# Patient Record
Sex: MALE | Race: OTHER | HISPANIC OR LATINO | URBAN - METROPOLITAN AREA
[De-identification: names, ages, dates, MRNs, and addresses within clinical notes are randomized per-mention and may not be internally consistent; named-entity substitution may affect disease eponyms.]

---

## 2018-03-26 ENCOUNTER — APPOINTMENT (EMERGENCY)
Dept: RADIOLOGY | Facility: HOSPITAL | Age: 43
DRG: 208 | End: 2018-03-26

## 2018-03-26 ENCOUNTER — HOSPITAL ENCOUNTER (INPATIENT)
Facility: HOSPITAL | Age: 43
LOS: 16 days | Discharge: HOME | DRG: 208 | End: 2018-04-11
Attending: EMERGENCY MEDICINE | Admitting: EMERGENCY MEDICINE

## 2018-03-26 DIAGNOSIS — E87.2 LACTIC ACIDOSIS: ICD-10-CM

## 2018-03-26 DIAGNOSIS — G93.89 ENCEPHALOMALACIA: ICD-10-CM

## 2018-03-26 DIAGNOSIS — G93.40 ACUTE ENCEPHALOPATHY: ICD-10-CM

## 2018-03-26 DIAGNOSIS — J96.01 ACUTE RESPIRATORY FAILURE WITH HYPOXIA (HCC): ICD-10-CM

## 2018-03-26 DIAGNOSIS — I96 NECROTIC ESCHAR (HCC): ICD-10-CM

## 2018-03-26 DIAGNOSIS — I10 ESSENTIAL HYPERTENSION: ICD-10-CM

## 2018-03-26 DIAGNOSIS — R56.9 SEIZURE (HCC): ICD-10-CM

## 2018-03-26 DIAGNOSIS — R41.89 UNRESPONSIVE: Primary | ICD-10-CM

## 2018-03-26 LAB
ALBUMIN SERPL BCP-MCNC: 4.3 G/DL (ref 3.5–5)
ALP SERPL-CCNC: 143 U/L (ref 46–116)
ALT SERPL W P-5'-P-CCNC: 39 U/L (ref 12–78)
AMPHETAMINES SERPL QL SCN: NEGATIVE
ANION GAP SERPL CALCULATED.3IONS-SCNC: 27 MMOL/L (ref 4–13)
ANION GAP SERPL CALCULATED.3IONS-SCNC: 8 MMOL/L (ref 4–13)
APAP SERPL-MCNC: <2 UG/ML (ref 10–30)
ARTERIAL PATENCY WRIST A: YES
AST SERPL W P-5'-P-CCNC: 101 U/L (ref 5–45)
BARBITURATES UR QL: NEGATIVE
BASE EXCESS BLDA CALC-SCNC: -2.5 MMOL/L
BASOPHILS # BLD AUTO: 0.01 THOUSANDS/ΜL (ref 0–0.1)
BASOPHILS # BLD AUTO: 0.05 THOUSANDS/ΜL (ref 0–0.1)
BASOPHILS NFR BLD AUTO: 0 % (ref 0–1)
BASOPHILS NFR BLD AUTO: 0 % (ref 0–1)
BENZODIAZ UR QL: NEGATIVE
BILIRUB SERPL-MCNC: 0.42 MG/DL (ref 0.2–1)
BUN SERPL-MCNC: 8 MG/DL (ref 5–25)
BUN SERPL-MCNC: 9 MG/DL (ref 5–25)
CALCIUM SERPL-MCNC: 7.7 MG/DL (ref 8.3–10.1)
CALCIUM SERPL-MCNC: 8.9 MG/DL (ref 8.3–10.1)
CHLORIDE SERPL-SCNC: 100 MMOL/L (ref 100–108)
CHLORIDE SERPL-SCNC: 106 MMOL/L (ref 100–108)
CK MB SERPL-MCNC: 4.2 NG/ML (ref 0–5)
CK MB SERPL-MCNC: <1 % (ref 0–2.5)
CK SERPL-CCNC: 459 U/L (ref 39–308)
CO2 SERPL-SCNC: 10 MMOL/L (ref 21–32)
CO2 SERPL-SCNC: 22 MMOL/L (ref 21–32)
COCAINE UR QL: NEGATIVE
CREAT SERPL-MCNC: 0.95 MG/DL (ref 0.6–1.3)
CREAT SERPL-MCNC: 1.52 MG/DL (ref 0.6–1.3)
EOSINOPHIL # BLD AUTO: 0.01 THOUSAND/ΜL (ref 0–0.61)
EOSINOPHIL # BLD AUTO: 0.23 THOUSAND/ΜL (ref 0–0.61)
EOSINOPHIL NFR BLD AUTO: 0 % (ref 0–6)
EOSINOPHIL NFR BLD AUTO: 2 % (ref 0–6)
ERYTHROCYTE [DISTWIDTH] IN BLOOD BY AUTOMATED COUNT: 16 % (ref 11.6–15.1)
ERYTHROCYTE [DISTWIDTH] IN BLOOD BY AUTOMATED COUNT: 16.2 % (ref 11.6–15.1)
ETHANOL SERPL-MCNC: 17 MG/DL (ref 0–3)
GFR SERPL CREATININE-BSD FRML MDRD: 26 ML/MIN/1.73SQ M
GFR SERPL CREATININE-BSD FRML MDRD: 46 ML/MIN/1.73SQ M
GLUCOSE SERPL-MCNC: 104 MG/DL (ref 65–140)
GLUCOSE SERPL-MCNC: 109 MG/DL (ref 65–140)
GLUCOSE SERPL-MCNC: 138 MG/DL (ref 65–140)
HCO3 BLDA-SCNC: 23 MMOL/L (ref 22–28)
HCT VFR BLD AUTO: 30.5 % (ref 36.5–49.3)
HCT VFR BLD AUTO: 34.6 % (ref 36.5–49.3)
HGB BLD-MCNC: 10.6 G/DL (ref 12–17)
HGB BLD-MCNC: 9.9 G/DL (ref 12–17)
HOROWITZ INDEX BLDA+IHG-RTO: 50 MM[HG]
LACTATE SERPL-SCNC: 1.9 MMOL/L (ref 0.5–2)
LACTATE SERPL-SCNC: 20.8 MMOL/L (ref 0.5–2)
LYMPHOCYTES # BLD AUTO: 0.33 THOUSANDS/ΜL (ref 0.6–4.47)
LYMPHOCYTES # BLD AUTO: 7.67 THOUSANDS/ΜL (ref 0.6–4.47)
LYMPHOCYTES NFR BLD AUTO: 5 % (ref 14–44)
LYMPHOCYTES NFR BLD AUTO: 63 % (ref 14–44)
MAGNESIUM SERPL-MCNC: 2 MG/DL (ref 1.6–2.6)
MCH RBC QN AUTO: 28.1 PG (ref 26.8–34.3)
MCH RBC QN AUTO: 28.2 PG (ref 26.8–34.3)
MCHC RBC AUTO-ENTMCNC: 30.6 G/DL (ref 31.4–37.4)
MCHC RBC AUTO-ENTMCNC: 32.5 G/DL (ref 31.4–37.4)
MCV RBC AUTO: 87 FL (ref 82–98)
MCV RBC AUTO: 92 FL (ref 82–98)
METHADONE UR QL: NEGATIVE
MONOCYTES # BLD AUTO: 0.39 THOUSAND/ΜL (ref 0.17–1.22)
MONOCYTES # BLD AUTO: 0.71 THOUSAND/ΜL (ref 0.17–1.22)
MONOCYTES NFR BLD AUTO: 6 % (ref 4–12)
MONOCYTES NFR BLD AUTO: 6 % (ref 4–12)
NEUTROPHILS # BLD AUTO: 3.65 THOUSANDS/ΜL (ref 1.85–7.62)
NEUTROPHILS # BLD AUTO: 5.48 THOUSANDS/ΜL (ref 1.85–7.62)
NEUTS SEG NFR BLD AUTO: 29 % (ref 43–75)
NEUTS SEG NFR BLD AUTO: 89 % (ref 43–75)
NRBC BLD AUTO-RTO: 0 /100 WBCS
NRBC BLD AUTO-RTO: 0 /100 WBCS
O2 CT BLDA-SCNC: 15 ML/DL (ref 16–23)
OPIATES UR QL SCN: NEGATIVE
OXYHGB MFR BLDA: 98.5 % (ref 94–97)
PCO2 BLDA: 42.9 MM HG (ref 36–44)
PCP UR QL: NEGATIVE
PEEP RESPIRATORY: 8 CM[H2O]
PH BLDA: 7.35 [PH] (ref 7.35–7.45)
PHOSPHATE SERPL-MCNC: 3.4 MG/DL (ref 2.3–4.1)
PLATELET # BLD AUTO: 48 THOUSANDS/UL (ref 149–390)
PLATELET # BLD AUTO: 48 THOUSANDS/UL (ref 149–390)
PLATELET # BLD AUTO: 71 THOUSANDS/UL (ref 149–390)
PMV BLD AUTO: 10 FL (ref 8.9–12.7)
PMV BLD AUTO: 10 FL (ref 8.9–12.7)
PMV BLD AUTO: 9.9 FL (ref 8.9–12.7)
PO2 BLDA: 148 MM HG (ref 75–129)
POTASSIUM SERPL-SCNC: 3.6 MMOL/L (ref 3.5–5.3)
POTASSIUM SERPL-SCNC: 4.1 MMOL/L (ref 3.5–5.3)
PROT SERPL-MCNC: 8.9 G/DL (ref 6.4–8.2)
RBC # BLD AUTO: 3.51 MILLION/UL (ref 3.88–5.62)
RBC # BLD AUTO: 3.77 MILLION/UL (ref 3.88–5.62)
SALICYLATES SERPL-MCNC: <3 MG/DL (ref 3–20)
SODIUM SERPL-SCNC: 136 MMOL/L (ref 136–145)
SODIUM SERPL-SCNC: 137 MMOL/L (ref 136–145)
SPECIMEN SOURCE: ABNORMAL
THC UR QL: NEGATIVE
VENT AC: 14
VENT- AC: AC
VT SETTING VENT: 470 ML
WBC # BLD AUTO: 12.44 THOUSAND/UL (ref 4.31–10.16)
WBC # BLD AUTO: 6.26 THOUSAND/UL (ref 4.31–10.16)

## 2018-03-26 PROCEDURE — 99291 CRITICAL CARE FIRST HOUR: CPT | Performed by: EMERGENCY MEDICINE

## 2018-03-26 PROCEDURE — 85025 COMPLETE CBC W/AUTO DIFF WBC: CPT | Performed by: EMERGENCY MEDICINE

## 2018-03-26 PROCEDURE — 94002 VENT MGMT INPAT INIT DAY: CPT

## 2018-03-26 PROCEDURE — 36415 COLL VENOUS BLD VENIPUNCTURE: CPT | Performed by: EMERGENCY MEDICINE

## 2018-03-26 PROCEDURE — 83605 ASSAY OF LACTIC ACID: CPT | Performed by: EMERGENCY MEDICINE

## 2018-03-26 PROCEDURE — 72125 CT NECK SPINE W/O DYE: CPT

## 2018-03-26 PROCEDURE — 83735 ASSAY OF MAGNESIUM: CPT | Performed by: EMERGENCY MEDICINE

## 2018-03-26 PROCEDURE — 71045 X-RAY EXAM CHEST 1 VIEW: CPT

## 2018-03-26 PROCEDURE — 80307 DRUG TEST PRSMV CHEM ANLYZR: CPT | Performed by: EMERGENCY MEDICINE

## 2018-03-26 PROCEDURE — 82805 BLOOD GASES W/O2 SATURATION: CPT | Performed by: STUDENT IN AN ORGANIZED HEALTH CARE EDUCATION/TRAINING PROGRAM

## 2018-03-26 PROCEDURE — 82550 ASSAY OF CK (CPK): CPT | Performed by: EMERGENCY MEDICINE

## 2018-03-26 PROCEDURE — 85049 AUTOMATED PLATELET COUNT: CPT | Performed by: EMERGENCY MEDICINE

## 2018-03-26 PROCEDURE — 94760 N-INVAS EAR/PLS OXIMETRY 1: CPT

## 2018-03-26 PROCEDURE — 82948 REAGENT STRIP/BLOOD GLUCOSE: CPT

## 2018-03-26 PROCEDURE — 99291 CRITICAL CARE FIRST HOUR: CPT

## 2018-03-26 PROCEDURE — 94770 HB EXHALED CARBON DIOXIDE TEST: CPT

## 2018-03-26 PROCEDURE — 36600 WITHDRAWAL OF ARTERIAL BLOOD: CPT

## 2018-03-26 PROCEDURE — C9113 INJ PANTOPRAZOLE SODIUM, VIA: HCPCS | Performed by: EMERGENCY MEDICINE

## 2018-03-26 PROCEDURE — 92610 EVALUATE SWALLOWING FUNCTION: CPT

## 2018-03-26 PROCEDURE — 80329 ANALGESICS NON-OPIOID 1 OR 2: CPT | Performed by: EMERGENCY MEDICINE

## 2018-03-26 PROCEDURE — 70450 CT HEAD/BRAIN W/O DYE: CPT

## 2018-03-26 PROCEDURE — 96361 HYDRATE IV INFUSION ADD-ON: CPT

## 2018-03-26 PROCEDURE — 5A1935Z RESPIRATORY VENTILATION, LESS THAN 24 CONSECUTIVE HOURS: ICD-10-PCS | Performed by: EMERGENCY MEDICINE

## 2018-03-26 PROCEDURE — 0BH17EZ INSERTION OF ENDOTRACHEAL AIRWAY INTO TRACHEA, VIA NATURAL OR ARTIFICIAL OPENING: ICD-10-PCS | Performed by: EMERGENCY MEDICINE

## 2018-03-26 PROCEDURE — 80320 DRUG SCREEN QUANTALCOHOLS: CPT | Performed by: EMERGENCY MEDICINE

## 2018-03-26 PROCEDURE — 80053 COMPREHEN METABOLIC PANEL: CPT | Performed by: EMERGENCY MEDICINE

## 2018-03-26 PROCEDURE — 80048 BASIC METABOLIC PNL TOTAL CA: CPT | Performed by: EMERGENCY MEDICINE

## 2018-03-26 PROCEDURE — 96374 THER/PROPH/DIAG INJ IV PUSH: CPT

## 2018-03-26 PROCEDURE — 82553 CREATINE MB FRACTION: CPT | Performed by: EMERGENCY MEDICINE

## 2018-03-26 PROCEDURE — 84100 ASSAY OF PHOSPHORUS: CPT | Performed by: EMERGENCY MEDICINE

## 2018-03-26 RX ORDER — LORAZEPAM 2 MG/ML
1 INJECTION INTRAMUSCULAR EVERY 6 HOURS PRN
Status: DISCONTINUED | OUTPATIENT
Start: 2018-03-26 | End: 2018-03-27

## 2018-03-26 RX ORDER — PANTOPRAZOLE SODIUM 40 MG/1
40 INJECTION, POWDER, FOR SOLUTION INTRAVENOUS
Status: DISCONTINUED | OUTPATIENT
Start: 2018-03-26 | End: 2018-03-26

## 2018-03-26 RX ORDER — PROPOFOL 10 MG/ML
5-50 INJECTION, EMULSION INTRAVENOUS
Status: DISCONTINUED | OUTPATIENT
Start: 2018-03-26 | End: 2018-03-26

## 2018-03-26 RX ORDER — LORAZEPAM 2 MG/ML
6 INJECTION INTRAMUSCULAR ONCE
Status: COMPLETED | OUTPATIENT
Start: 2018-03-26 | End: 2018-03-26

## 2018-03-26 RX ORDER — LORAZEPAM 2 MG/ML
INJECTION INTRAMUSCULAR
Status: COMPLETED
Start: 2018-03-26 | End: 2018-03-26

## 2018-03-26 RX ORDER — LABETALOL HYDROCHLORIDE 5 MG/ML
10 INJECTION, SOLUTION INTRAVENOUS EVERY 6 HOURS PRN
Status: DISCONTINUED | OUTPATIENT
Start: 2018-03-26 | End: 2018-04-07

## 2018-03-26 RX ORDER — LORAZEPAM 2 MG/ML
2 INJECTION INTRAMUSCULAR EVERY 6 HOURS PRN
Status: DISCONTINUED | OUTPATIENT
Start: 2018-03-26 | End: 2018-03-26

## 2018-03-26 RX ORDER — AMLODIPINE BESYLATE 5 MG/1
5 TABLET ORAL DAILY
Status: DISCONTINUED | OUTPATIENT
Start: 2018-03-27 | End: 2018-04-11 | Stop reason: HOSPADM

## 2018-03-26 RX ORDER — PANTOPRAZOLE SODIUM 40 MG/1
40 TABLET, DELAYED RELEASE ORAL
Status: DISCONTINUED | OUTPATIENT
Start: 2018-03-27 | End: 2018-03-26

## 2018-03-26 RX ORDER — ACETAMINOPHEN 325 MG/1
650 TABLET ORAL EVERY 6 HOURS PRN
Status: DISCONTINUED | OUTPATIENT
Start: 2018-03-26 | End: 2018-03-27

## 2018-03-26 RX ORDER — HEPARIN SODIUM 5000 [USP'U]/ML
5000 INJECTION, SOLUTION INTRAVENOUS; SUBCUTANEOUS EVERY 8 HOURS SCHEDULED
Status: DISCONTINUED | OUTPATIENT
Start: 2018-03-26 | End: 2018-04-11 | Stop reason: HOSPADM

## 2018-03-26 RX ORDER — SODIUM CHLORIDE 9 MG/ML
125 INJECTION, SOLUTION INTRAVENOUS CONTINUOUS
Status: DISCONTINUED | OUTPATIENT
Start: 2018-03-26 | End: 2018-03-26

## 2018-03-26 RX ORDER — ETOMIDATE 2 MG/ML
30 INJECTION INTRAVENOUS ONCE
Status: COMPLETED | OUTPATIENT
Start: 2018-03-26 | End: 2018-03-26

## 2018-03-26 RX ORDER — LABETALOL HYDROCHLORIDE 5 MG/ML
10 INJECTION, SOLUTION INTRAVENOUS ONCE
Status: COMPLETED | OUTPATIENT
Start: 2018-03-26 | End: 2018-03-26

## 2018-03-26 RX ORDER — PROPOFOL 10 MG/ML
100 INJECTION, EMULSION INTRAVENOUS ONCE
Status: COMPLETED | OUTPATIENT
Start: 2018-03-26 | End: 2018-03-26

## 2018-03-26 RX ORDER — PANTOPRAZOLE SODIUM 40 MG/1
40 TABLET, DELAYED RELEASE ORAL
Status: DISCONTINUED | OUTPATIENT
Start: 2018-03-27 | End: 2018-04-11 | Stop reason: HOSPADM

## 2018-03-26 RX ORDER — SUCCINYLCHOLINE CHLORIDE 20 MG/ML
120 INJECTION INTRAMUSCULAR; INTRAVENOUS ONCE
Status: COMPLETED | OUTPATIENT
Start: 2018-03-26 | End: 2018-03-26

## 2018-03-26 RX ORDER — LANOLIN ALCOHOL/MO/W.PET/CERES
400 CREAM (GRAM) TOPICAL DAILY
Status: DISCONTINUED | OUTPATIENT
Start: 2018-03-27 | End: 2018-04-11 | Stop reason: HOSPADM

## 2018-03-26 RX ORDER — THIAMINE MONONITRATE (VIT B1) 100 MG
100 TABLET ORAL DAILY
Status: DISCONTINUED | OUTPATIENT
Start: 2018-03-27 | End: 2018-04-11 | Stop reason: HOSPADM

## 2018-03-26 RX ORDER — LABETALOL HYDROCHLORIDE 5 MG/ML
INJECTION, SOLUTION INTRAVENOUS
Status: COMPLETED
Start: 2018-03-26 | End: 2018-03-26

## 2018-03-26 RX ORDER — ACETAMINOPHEN 160 MG/5ML
650 SUSPENSION, ORAL (FINAL DOSE FORM) ORAL EVERY 4 HOURS PRN
Status: DISCONTINUED | OUTPATIENT
Start: 2018-03-26 | End: 2018-03-26

## 2018-03-26 RX ORDER — POTASSIUM CHLORIDE 14.9 MG/ML
20 INJECTION INTRAVENOUS
Status: COMPLETED | OUTPATIENT
Start: 2018-03-26 | End: 2018-03-27

## 2018-03-26 RX ORDER — LEVETIRACETAM 500 MG/1
1000 TABLET ORAL EVERY 12 HOURS SCHEDULED
Status: DISCONTINUED | OUTPATIENT
Start: 2018-03-27 | End: 2018-03-29

## 2018-03-26 RX ORDER — CHLORHEXIDINE GLUCONATE 0.12 MG/ML
15 RINSE ORAL EVERY 12 HOURS SCHEDULED
Status: DISCONTINUED | OUTPATIENT
Start: 2018-03-26 | End: 2018-03-26

## 2018-03-26 RX ADMIN — SODIUM CHLORIDE 125 ML/HR: 0.9 INJECTION, SOLUTION INTRAVENOUS at 05:40

## 2018-03-26 RX ADMIN — PROPOFOL 50 MCG/KG/MIN: 10 INJECTION, EMULSION INTRAVENOUS at 03:53

## 2018-03-26 RX ADMIN — HEPARIN SODIUM 5000 UNITS: 5000 INJECTION, SOLUTION INTRAVENOUS; SUBCUTANEOUS at 05:40

## 2018-03-26 RX ADMIN — PANTOPRAZOLE SODIUM 40 MG: 40 INJECTION, POWDER, FOR SOLUTION INTRAVENOUS at 08:13

## 2018-03-26 RX ADMIN — SODIUM CHLORIDE 1000 ML: 0.9 INJECTION, SOLUTION INTRAVENOUS at 02:20

## 2018-03-26 RX ADMIN — PROPOFOL 100 MG: 10 INJECTION, EMULSION INTRAVENOUS at 05:31

## 2018-03-26 RX ADMIN — ACETAMINOPHEN 650 MG: 160 SUSPENSION ORAL at 13:11

## 2018-03-26 RX ADMIN — LABETALOL 20 MG/4 ML (5 MG/ML) INTRAVENOUS SYRINGE: at 05:32

## 2018-03-26 RX ADMIN — THIAMINE HYDROCHLORIDE 100 MG: 100 INJECTION, SOLUTION INTRAMUSCULAR; INTRAVENOUS at 09:55

## 2018-03-26 RX ADMIN — POTASSIUM CHLORIDE 20 MEQ: 200 INJECTION, SOLUTION INTRAVENOUS at 05:40

## 2018-03-26 RX ADMIN — FENTANYL CITRATE 50 MCG/HR: at 04:16

## 2018-03-26 RX ADMIN — SODIUM CHLORIDE 1000 ML: 0.9 INJECTION, SOLUTION INTRAVENOUS at 03:57

## 2018-03-26 RX ADMIN — CHLORHEXIDINE GLUCONATE 15 ML: 1.2 RINSE ORAL at 08:13

## 2018-03-26 RX ADMIN — LORAZEPAM: 2 INJECTION INTRAMUSCULAR; INTRAVENOUS at 05:31

## 2018-03-26 RX ADMIN — PROPOFOL 50 MCG/KG/MIN: 10 INJECTION, EMULSION INTRAVENOUS at 05:40

## 2018-03-26 RX ADMIN — SODIUM CHLORIDE 125 ML/HR: 0.9 INJECTION, SOLUTION INTRAVENOUS at 15:00

## 2018-03-26 RX ADMIN — HEPARIN SODIUM 5000 UNITS: 5000 INJECTION, SOLUTION INTRAVENOUS; SUBCUTANEOUS at 14:35

## 2018-03-26 RX ADMIN — SUCCINYLCHOLINE CHLORIDE 120 MG: 20 INJECTION, SOLUTION INTRAMUSCULAR; INTRAVENOUS at 02:20

## 2018-03-26 RX ADMIN — LABETALOL 20 MG/4 ML (5 MG/ML) INTRAVENOUS SYRINGE 10 MG: at 13:12

## 2018-03-26 RX ADMIN — PROPOFOL 100 MG: 10 INJECTION, EMULSION INTRAVENOUS at 03:00

## 2018-03-26 RX ADMIN — FOLIC ACID 1 MG: 5 INJECTION, SOLUTION INTRAMUSCULAR; INTRAVENOUS; SUBCUTANEOUS at 10:55

## 2018-03-26 RX ADMIN — PROPOFOL 50 MCG/KG/MIN: 10 INJECTION, EMULSION INTRAVENOUS at 08:13

## 2018-03-26 RX ADMIN — ETOMIDATE 30 MG: 2 INJECTION INTRAVENOUS at 02:20

## 2018-03-26 RX ADMIN — LABETALOL HYDROCHLORIDE 10 MG: 5 INJECTION, SOLUTION INTRAVENOUS at 05:17

## 2018-03-26 RX ADMIN — HEPARIN SODIUM 5000 UNITS: 5000 INJECTION, SOLUTION INTRAVENOUS; SUBCUTANEOUS at 21:51

## 2018-03-26 RX ADMIN — LEVETIRACETAM 1000 MG: 100 INJECTION, SOLUTION INTRAVENOUS at 16:35

## 2018-03-26 RX ADMIN — POTASSIUM CHLORIDE 20 MEQ: 200 INJECTION, SOLUTION INTRAVENOUS at 08:00

## 2018-03-26 RX ADMIN — LEVETIRACETAM 1500 MG: 100 INJECTION, SOLUTION INTRAVENOUS at 03:45

## 2018-03-26 RX ADMIN — LORAZEPAM 6 MG: 2 INJECTION INTRAMUSCULAR; INTRAVENOUS at 02:45

## 2018-03-26 NOTE — ED PROVIDER NOTES
History  Chief Complaint   Patient presents with    Loss of Consciousness     pt brought in  via EMS from [facility] with loss of consciousness and questionable seizure activity     Patient is brought in by ambulance from the [facility] now unresponsive  Patient is a Ashlee Chester  Per EMS report, patient potentially had a seizure and is intoxicated  On exam, patient is unresponsive, hypoxic with oxygen saturations in the 70s, heart rate 150-160, moves all 4 extremities though does not respond to a sternal rub  Does have dried blood along his mouth and his clothing is wet and saturated and urine  Assessment and plan:  Alcohol intoxication versus withdrawal seizure versus polypharmacy  Intubated for airway protection  CT of the head and cervical spine to rule out acute hemorrhage/fracture  Labs to rule out electrolyte abnormalities  IV fluid for resuscitation  None       No past medical history on file  No past surgical history on file  No family history on file  I have reviewed and agree with the history as documented  Social History   Substance Use Topics    Smoking status: Not on file    Smokeless tobacco: Not on file    Alcohol use Not on file        Review of Systems   Unable to perform ROS: Patient unresponsive       Physical Exam  ED Triage Vitals   Temperature Pulse Respirations Blood Pressure SpO2   03/26/18 0301 03/26/18 0301 03/26/18 0301 03/26/18 0301 03/26/18 0244   100 2 °F (37 9 °C) (!) 151 16 137/70 94 %      Temp Source Heart Rate Source Patient Position - Orthostatic VS BP Location FiO2 (%)   03/26/18 0301 03/26/18 0349 -- -- --   Probe Monitor         Pain Score       03/26/18 0301       No Pain           Orthostatic Vital Signs  Vitals:    03/26/18 0301 03/26/18 0349 03/26/18 0410   BP: 137/70 142/85 156/93   Pulse: (!) 151 (!) 133 (!) 133       Physical Exam   Constitutional: He appears well-developed and well-nourished  He appears distressed     HENT: Head: Normocephalic  Right Ear: External ear normal    Left Ear: External ear normal    Nose: Nose normal    Dried blood on lips with a small cut on the lower lip   Eyes: Conjunctivae are normal  Pupils are equal, round, and reactive to light  Neck:   In cervical collar   Cardiovascular: Regular rhythm, normal heart sounds and intact distal pulses  Exam reveals no gallop and no friction rub  No murmur heard  tachycardic   Pulmonary/Chest: Effort normal and breath sounds normal  No respiratory distress  He has no wheezes  He has no rales  He exhibits no tenderness  Abdominal: Soft  Bowel sounds are normal  He exhibits no distension  There is no tenderness  Musculoskeletal: Normal range of motion  He exhibits no edema or tenderness  Neurological: He is unresponsive  GCS eye subscore is 1  Moves all 4 extremities   Skin: Skin is warm  Capillary refill takes less than 2 seconds  He is diaphoretic  Chronic appearing wound to the LUE   Nursing note and vitals reviewed        ED Medications  Medications   LORazepam (ATIVAN) 2 mg/mL injection **AcuDose Override Pull** (not administered)   LORazepam (ATIVAN) 2 mg/mL injection **AcuDose Override Pull** (not administered)   sodium chloride 0 9 % bolus 1,000 mL (1,000 mL Intravenous New Bag 3/26/18 9862)   chlorhexidine (PERIDEX) 0 12 % oral rinse 15 mL (not administered)   heparin (porcine) subcutaneous injection 5,000 Units (not administered)   levETIRAcetam (KEPPRA) 1,000 mg in sodium chloride 0 9 % 100 mL IVPB (not administered)   propofol (DIPRIVAN) 1000 mg in 100 mL infusion (premix) (not administered)   fentaNYL 1250 mcg in sodium chloride 0 9% 125mL drip (50 mcg/hr Intravenous New Bag 3/26/18 3057)   sodium chloride 0 9 % infusion (not administered)   LORazepam (ATIVAN) 2 mg/mL injection 2 mg (not administered)   propofol (DIPRIVAN) 200 MG/20ML bolus injection 100 mg (not administered)   pantoprazole (PROTONIX) injection 40 mg (not administered) potassium chloride 20 mEq IVPB (premix) (not administered)   levETIRAcetam (KEPPRA) 1,500 mg in sodium chloride 0 9 % 100 mL IVPB (0 mg Intravenous Stopped 3/26/18 0357)   sodium chloride 0 9 % bolus 1,000 mL (0 mL Intravenous Stopped 3/26/18 0357)   etomidate (AMIDATE) 2 mg/mL injection 30 mg (30 mg Intravenous Given 3/26/18 0220)   succinylcholine (ANECTINE) 20 mg/mL injection 120 mg (120 mg Intravenous Given 3/26/18 0220)   LORazepam (ATIVAN) 2 mg/mL injection 6 mg (6 mg Intravenous Given 3/26/18 0245)   propofol (DIPRIVAN) 200 MG/20ML bolus injection 100 mg (100 mg Intravenous Given 3/26/18 0300)       Diagnostic Studies  Results Reviewed     Procedure Component Value Units Date/Time    CK Total with Reflex CKMB [[id number]]  (Abnormal) Collected:  03/26/18 0304    Lab Status:  Final result Specimen:  Blood from Arm, Right Updated:  03/26/18 0428     Total  (H) U/L     Salicylate level [[id number]]  (Abnormal) Collected:  03/26/18 0304    Lab Status:  Final result Specimen:  Blood from Arm, Right Updated:  54/61/70 5788     Salicylate Lvl <3 (L) mg/dL     Acetaminophen level [[id number]]  (Abnormal) Collected:  03/26/18 0304    Lab Status:  Final result Specimen:  Blood from Arm, Right Updated:  03/26/18 0428     Acetaminophen Level <2 (L) ug/mL     Comprehensive metabolic panel [[id number]]  (Abnormal) Collected:  03/26/18 0304    Lab Status:  Final result Specimen:  Blood from Arm, Right Updated:  03/26/18 0428     Sodium 137 mmol/L      Potassium 3 6 mmol/L      Chloride 100 mmol/L      CO2 10 (L) mmol/L      Anion Gap 27 (H) mmol/L      BUN 9 mg/dL      Creatinine 1 52 (H) mg/dL      Glucose 138 mg/dL      Calcium 8 9 mg/dL       (H) U/L      ALT 39 U/L      Alkaline Phosphatase 143 (H) U/L      Total Protein 8 9 (H) g/dL      Albumin 4 3 g/dL      Total Bilirubin 0 42 mg/dL      eGFR 26 ml/min/1 73sq m     Narrative:         National Kidney Disease Education Program recommendations are as follows:  GFR calculation is accurate only with a steady state creatinine  Chronic Kidney disease less than 60 ml/min/1 73 sq  meters  Kidney failure less than 15 ml/min/1 73 sq  meters  CKMB [[id number]] Collected:  03/26/18 0304    Lab Status:   In process Specimen:  Blood from Arm, Right Updated:  03/26/18 0428    CBC and differential [[id number]]  (Abnormal) Collected:  03/26/18 0304    Lab Status:  Final result Specimen:  Blood from Arm, Right Updated:  03/26/18 0421     WBC 12 44 (H) Thousand/uL      RBC 3 77 (L) Million/uL      Hemoglobin 10 6 (L) g/dL      Hematocrit 34 6 (L) %      MCV 92 fL      MCH 28 1 pg      MCHC 30 6 (L) g/dL      RDW 16 2 (H) %      MPV 9 9 fL      Platelets 71 (L) Thousands/uL      nRBC 0 /100 WBCs      Neutrophils Relative 29 (L) %      Lymphocytes Relative 63 (H) %      Monocytes Relative 6 %      Eosinophils Relative 2 %      Basophils Relative 0 %      Neutrophils Absolute 3 65 Thousands/µL      Lymphocytes Absolute 7 67 (H) Thousands/µL      Monocytes Absolute 0 71 Thousand/µL      Eosinophils Absolute 0 23 Thousand/µL      Basophils Absolute 0 05 Thousands/µL     Ethanol [[id number]]  (Abnormal) Collected:  03/26/18 0304    Lab Status:  Final result Specimen:  Blood from Arm, Right Updated:  03/26/18 0419     Ethanol Lvl 17 (H) mg/dL     Lactic acid in 2 hours [[id number]]     Lab Status:  No result Specimen:  Blood     Basic metabolic panel [[id number]]     Lab Status:  No result Specimen:  Blood     Magnesium [[id number]]     Lab Status:  No result Specimen:  Blood     Phosphorus [[id number]]     Lab Status:  No result Specimen:  Blood     CBC and differential [[id number]]     Lab Status:  No result Specimen:  Blood     Platelet count [[id number]]     Lab Status:  No result Specimen:  Blood     Lactic acid, plasma [[id number]]  (Abnormal) Collected:  03/26/18 0304    Lab Status:  Final result Specimen:  Blood from Arm, Right Updated:  03/26/18 0353     LACTIC ACID 20 8 (HH) mmol/L Narrative:         Result may be elevated if tourniquet was used during collection  Rapid drug screen, urine [[id number]]  (Normal) Collected:  03/26/18 0307    Lab Status:  Final result Specimen:  Urine from Urine, Catheter Updated:  03/26/18 0348     Amph/Meth UR Negative     Barbiturate Ur Negative     Benzodiazepine Urine Negative     Cocaine Urine Negative     Methadone Urine Negative     Opiate Urine Negative     PCP Ur Negative     THC Urine Negative    Narrative:         FOR MEDICAL PURPOSES ONLY  IF CONFIRMATION NEEDED PLEASE CONTACT THE LAB WITHIN 5 DAYS  Drug Screen Cutoff Levels:  AMPHETAMINE/METHAMPHETAMINES  1000 ng/mL  BARBITURATES     200 ng/mL  BENZODIAZEPINES     200 ng/mL  COCAINE      300 ng/mL  METHADONE      300 ng/mL  OPIATES      300 ng/mL  PHENCYCLIDINE     25 ng/mL  THC       50 ng/mL    POCT urinalysis dipstick [[id number]]     Lab Status:  No result Specimen:  Urine                  XR chest 1 view portable   ED Interpretation by Kaiser Buckley DO (03/26 5896)   No focal consolidations, ett proper position as interpreted by me independently       CT head without contrast   Final Result by Rita Candelaria MD (03/26 7187)      1  Status post right craniectomy and there is encephalomalacia within the right temporal lobe and bilateral frontal lobes which may be sequela of prior injury  2   No acute intracranial hemorrhage, midline shift, or mass effect  3   Chronic small vessel ischemic changes  Workstation performed: [id number]         CT cervical spine without contrast   Final Result by Rita Candelaria MD (03/26 6596)      No cervical spine fracture or traumatic malalignment                     Workstation performed: [id number]               Procedures  ECG 12 Lead Documentation  Date/Time: 3/26/2018 3:33 AM  Performed by: Massimo Solis  Authorized by: Erna Tiwari     Indications / Diagnosis:  Unresponsive  Patient location:  ED  Previous ECG: Previous ECG:  Unavailable  Rate:     ECG rate:  154    ECG rate assessment: tachycardic    Rhythm:     Rhythm: sinus tachycardia    Ectopy:     Ectopy: none    QRS:     QRS axis:  Normal  Conduction:     Conduction: normal    ST segments:     ST segments:  Non-specific    ST segment depression noted on lead: diffusely depressed  T waves:     T waves: non-specific            Phone Consults  ED Phone Contact    ED Course  ED Course as of Mar 26 0443   Mon Mar 26, 2018   7568 Likely 2/2 seizure activity  LACTIC ACID: (!!) 20 8   0429 MEDICAL ALCOHOL: (!) 17   0429 Total CK: (!) 459                               MDM  CritCare Time    Disposition  Final diagnoses:   Unresponsive   Seizure ([address] )   Lactic acidosis   Acute encephalopathy     Time reflects when diagnosis was documented in both MDM as applicable and the Disposition within this note     Time User Action Codes Description Comment    3/26/2018  3:55 AM Dufm Ardmore [R41 89] Unresponsive     3/26/2018  3:55 AM Loreda Cheshire Add [R56 9] Seizure ([address] )     3/26/2018  3:55 AM Loreda Cheshire Add [E87 2] Lactic acidosis     3/26/2018  4:00 AM Bryn Crease J Add [G93 40] Acute encephalopathy     3/26/2018  4:00 AM Gerardo Villasenor Modify [G93 40] Acute encephalopathy       ED Disposition     ED Disposition Condition Comment    Admit  Case was discussed with Dr Wenceslao Rao and the patient's admission status was agreed to be Admission Status: inpatient status to the service of Dr Wenceslao Rao   Follow-up Information    None       Patient's Medications    No medications on file     No discharge procedures on file  ED Provider  Attending physically available and evaluated Tau Tau One [place] And Ninety-Five  I managed the patient along with the ED Attending      Electronically Signed by         Viviana Rankin DO  03/26/18 9515

## 2018-03-26 NOTE — PROGRESS NOTES
Progress Note - Critical Care   Rachael Cano One [place] And [de-identified] 80 y o  male MRN: [id number]  [facility]: [id number]    Attending Physician: Teresa Mobley MD      ______________________________________________________________________  Principal Problem:    Acute respiratory failure with hypoxia [facility])  Active Problems:    Acute encephalopathy  Resolved Problems:    * No resolved hospital problems  *      Assessment and Plan:     Neuro:  Acute encephalopathy  -Seizure activity vs  Intoxication vs  Alcohol withdrawal   -History of right craniectomy   -Loaded with 1 5g keppra  Continue keppra 1g iv bid   -Consider starting thiamine and folate   -Consider EMU patient does not awaken    CV:   Tachycardic to 110s, improved from 150 on admission   -Tele strip unremarkable  Pulm:   Acute hypoxic respiratory failure  -Intubated on AC/VC 14/470/50/8, saturating 98%  -Continue respiratory protocol, bronchial hygiene  -ABG shows 7 35/43/148/23  Will titrate down FiO2    GI:   PPI Prophylaxis with IV protonix    Elevated LFTs  -On admission, , ALT 39, AlkP 143, Prot 8 9   -Likely secondary to etoh abuse  EtOH 17     :   SARA on admission  -Cr on admission 1 52 with unknown baseline  Most recently 0 95   -Continue NS @ 125/hr  -Rondon catheter in place, drained 1 9L/24 hours  F/E/N:   Fluids: NS @ 125/hr  Electrolytes: Lactate 21 -> 1 9  Bicarb 10 -> 22  Anion gap closed  BMP grossly wnl  Nutrition: NPO  Og tube in place    ID:   WBC 12 -> 6  Heme:   Anemia  -Hgb 10 6 -> 9 9  Drop likely dilutional with unknown baseline   -Likely secondary to alcohol abuse  DVT ppx  -Continue heparin and SCDs  -Monitor platelet count    Endo:   -No acute issues     Msk/Skin:   -Chronic appearing LUE wound   -Continue local wound care and repositioning      Disposition: Continue ICU care    Code Status: Level 1 - Full Code    ______________________________________________________________________    Chief Complaint:   intubated    HPI/24 Hour Events:   Tau One [employer] And Ninety-Five is a 80 y o  male Norma Castañeda brought in unresponsive by EMS from the [facility] with possible seizure activity  In the ED, he was febrile to 101 1, tachycardic to 150s, hypertensive to 150s  Labs notable for etOH level 17  bicarb 10, AG 27, cr 1 52, , lactate 21   utox negative  CT head showed right craniectomy with encephalomalacia, chronic ischemic changes, no acute ICH or mass effect  CT cervical spine shows no fracture or trauma  Received 6mg ativan, 2L saline, intubated and sedated with propofol and fentanyl, started on NS @ 125/hr  CXR post-intubation showed ETT in position and no consolidation  Transferred to the MICU     ______________________________________________________________________    Physical Exam:   General: middle age disheveled male lying in bed intubated and sedated  Malodorous feet  HEENT: perrl, dysconjugate gaze  Right craniectomy  Cardiovascular: tachycardic, regular rhythm w/o murmurs  Pulmonary: clear to auscultation bilaterally  Abdominal: soft, nontender, nondistended  Extremities:   Skin: chronic appearing LUE wound on anterior forearm  Neuro: intubated and sedated propofol 50 mcg/kg/min  GCS - [id number]  Moves all extremities       Tm: Tm 101 1 ( 100 8)  BP: 120/80 - 150/90  P: 110-150s  RR:14-31  O2: 98% on AC/VC 14/470/50/8    ______________________________________________________________________  Vitals:    18 0525 18 0540 18 0610 18 0709   BP: 140/98 147/96 135/95 128/86   BP Location: Left arm   Left arm   Pulse: (!) 114 (!) 114 (!) 114    Resp: 16 (!) 23 (!) 24    Temp: (!) 100 8 °F (38 2 °C) (!) 100 8 °F (38 2 °C) (!) 100 8 °F (38 2 °C)    TempSrc: Probe   Probe   SpO2: 98% 99% 98%    Weight: 74 3 kg (163 lb 12 8 oz)          Temperature:   Temp (24hrs), Av 7 °F (38 2 °C), Min:100 2 °F (37 9 °C), Max:101 1 °F (38 4 °C)    Current Temperature: (!) 100 8 °F (38 2 °C)  Weights:   IBW: -88 kg    There is no height or weight on file to calculate BMI  Weight (last 2 days)     Date/Time   Weight    03/26/18 0525  74 3 (163 8)    03/26/18 0301  86 2 (190)            Invasive lines and devices:   Invasive Devices     Peripheral Intravenous Line            Peripheral IV 03/26/18 Left Wrist less than 1 day    Peripheral IV 03/26/18 Right Arm less than 1 day    Peripheral IV 03/26/18 Right Wrist less than 1 day          Drain            NG/OG/Enteral Tube Orogastric less than 1 day    Urethral Catheter Temperature probe less than 1 day          Airway            ETT  Cuffed 8 mm less than 1 day              Hemodynamic Monitoring:  PAP:  , PAP mean:   , CVP:  , PCWP:   , SvO2:   , ScvO2:  , CO:  , CI:  , SVR:  , SVRI:  , PVR:  , SV:  , SVI:       Non-Invasive/Invasive Ventilation Settings:  Respiratory    Lab Data (Last 4 hours)    None         O2/Vent Data (Last 4 hours)      03/26 0435 03/26 0509 03/26 0706       Vent Mode CPAP/PS Spont AC/VC AC/VC     Resp Rate (BPM) (BPM)  14 14     Vt (mL) (mL)  470 470     FIO2 (%) (%)  100 50     PEEP (cmH2O) (cmH2O)  8 8     MV  8 4 7 35     FIO2 (%) (%) 50       PEEP (cmH2O) (cmH2O) 8       Pressure Support (cmH2O) (cmH20) 10       MV (Obs) 6 82       RSBI 42                 No results found for: PHART, DID5QTY, PO2ART, MDP5BEB, F2ATXLVM, BEART, SOURCE  SpO2: SpO2: 98 %, SpO2 Activity:  , SpO2 Device:  , Capnography: Capnography: Yes  Intake and Outputs:  I/O       03/24 0701 - 03/25 0700 03/25 0701 - 03/26 0700 03/26 0701 - 03/27 0700    I V  (mL/kg)  64 2 (0 9)     IV Piggyback  1000     Total Intake(mL/kg)  1064 2 (14 3)     Urine (mL/kg/hr)  1900     Total Output   1900      Net   -835 8                 Nutrition:        Diet Orders            Start     Ordered    03/26/18 0403  Diet NPO  Diet effective now     Question Answer Comment Diet Type NPO    RD to adjust diet per protocol? Yes        03/26/18 0402         Labs:     Results from last 7 days  Lab Units 03/26/18  0602 03/26/18  0304   WBC Thousand/uL 6 26 12 44*   HEMOGLOBIN g/dL 9 9* 10 6*   HEMATOCRIT % 30 5* 34 6*   PLATELETS Thousands/uL 48*  48* 71*   NEUTROS PCT % 89* 29*   MONOS PCT % 6 6       Results from last 7 days  Lab Units 03/26/18  0602 03/26/18  0304   SODIUM mmol/L 136 137   POTASSIUM mmol/L 4 1 3 6   CHLORIDE mmol/L 106 100   CO2 mmol/L 22 10*   BUN mg/dL 8 9   CREATININE mg/dL 0 95 1 52*   CALCIUM mg/dL 7 7* 8 9   TOTAL PROTEIN g/dL  --  8 9*   BILIRUBIN TOTAL mg/dL  --  0 42   ALK PHOS U/L  --  143*   ALT U/L  --  39   AST U/L  --  101*   GLUCOSE RANDOM mg/dL 109 138       Results from last 7 days  Lab Units 03/26/18  0602   MAGNESIUM mg/dL 2 0     Lab Results   Component Value Date    PHOS 3 4 03/26/2018          No results found for: TROPONINI    Results from last 7 days  Lab Units 03/26/18  0602 03/26/18  0304   LACTIC ACID mmol/L 1 9 20 8*     ABG:No results found for: PHART, OJT0QIU, PO2ART, XDT4IBB, R7BZZPAJ, BEART, SOURCE  EKG:   Micro:  No results found for: Hilaria Deep, SPUTUMCULTUR  Imaging: I have personally reviewed pertinent reports  Ct Head Without Contrast    Result Date: 3/26/2018  Impression: 1  Status post right craniectomy and there is encephalomalacia within the right temporal lobe and bilateral frontal lobes which may be sequela of prior injury  2   No acute intracranial hemorrhage, midline shift, or mass effect  3   Chronic small vessel ischemic changes  Workstation performed: [id number]     Ct Cervical Spine Without Contrast    Result Date: 3/26/2018  Impression: No cervical spine fracture or traumatic malalignment    Workstation performed: [id number]     Allergies: Not on File  Medications:   Scheduled Meds:    Current Facility-Administered Medications:  chlorhexidine 15 mL [employer] Q12H [address] Antelmo Sommer MD fentaNYL 50 mcg/hr Intravenous Continuous Lynette Rankin MD Last Rate: 50 mcg/hr (03/26/18 0416)   heparin (porcine) 5,000 Units Subcutaneous [facility] Lynette Rankin MD    levETIRAcetam 1,000 mg Intravenous Q12H [address] Lynette Rankin MD    LORazepam 2 mg Intravenous Q6H PRN Lynette Rankin MD    pantoprazole 40 mg Intravenous Q24H [address] Lynette Rankin MD    potassium chloride 20 mEq Intravenous Q2H Lynette Rankin MD Last Rate: 20 mEq (03/26/18 0540)   propofol 5-50 mcg/kg/min Intravenous Titrated Lynette Rankin MD Last Rate: 50 mcg/kg/min (03/26/18 0540)   sodium chloride 125 mL/hr Intravenous Continuous Lynette Rankin MD Last Rate: 125 mL/hr (03/26/18 0540)     Continuous Infusions:    fentaNYL 50 mcg/hr Last Rate: 50 mcg/hr (03/26/18 0416)   propofol 5-50 mcg/kg/min Last Rate: 50 mcg/kg/min (03/26/18 0540)   sodium chloride 125 mL/hr Last Rate: 125 mL/hr (03/26/18 0540)     PRN Meds:    LORazepam 2 mg Q6H PRN     VTE Pharmacologic Prophylaxis: Heparin  VTE Mechanical Prophylaxis: sequential compression device    Portions of the record may have been created with voice recognition software  Occasional wrong word or "sound a like" substitutions may have occurred due to the inherent limitations of voice recognition software  Read the chart carefully and recognize, using context, where substitutions have occurred      Murray Ocampo MD

## 2018-03-26 NOTE — RESPIRATORY THERAPY NOTE
RT Ventilator Management Note  Tau Tau One [place] And Ninety-Five 80 y o  male MRN: [id number]  Unit/Bed#: ED 05 Encounter: [id number]      Daily Screen     No data found  Physical Exam:   Assessment Type: Assess only  General Appearance: Sedated  Respiratory Pattern: Assisted  Chest Assessment: Chest expansion symmetrical  Bilateral Breath Sounds: (P) Coarse      Resp Comments: (P) Pt intubated at bedside by MD  22@ the lips  Bilateral breath sounds  Pt placed on vent  No changes made at this time  Will continue to monitor

## 2018-03-26 NOTE — RESPIRATORY THERAPY NOTE
RT Ventilator Management Note  Tau Tau One [place] And Ninety-Five 80 y o  male MRN: [id number]  Unit/Bed#: [facility] 01 Encounter: [id number]      Daily Screen       3/26/2018 0706             Spont breathing trial outcome[de-identified] -            Physical Exam:   Assessment Type: (P) Assess only  General Appearance: Sedated  Respiratory Pattern: Assisted  Chest Assessment: Chest expansion symmetrical  Bilateral Breath Sounds: Coarse  Suction: (P) ET Tube      Resp Comments: (P) pt appears comfortable on current settings, will continue to monitor

## 2018-03-26 NOTE — H&P
History and Physical - Critical Care  Rachael Rachael One [place] [de-identified] 80 y o  male MRN: [id number]  Unit/Bed#: ED 05 Encounter: [id number]     Reason for Admission / Chief Complaint: unresponsive     History of Present Illness:  Rachael Cano One [place] is a 80 y o  male Kayla Castañeda brought in unresponsive by EMS from the [facility] with possible seizure activity  Patient received ativan in the ED and was intubated for hypoxemia  Patient moving all 4 extremities in the ED with purposeful movements prior to sedation  CT head shows prior right craniectomy with encephalomalacia, but no acute findings  Patient is unable to provide any history  History obtained from chart review  Past Medical History:  No past medical history on file  Past Surgical History:  No past surgical history on file  Past Family History:  No family history on file       Social History:  History   Smoking Status    Not on file   Smokeless Tobacco    Not on file     History   Alcohol use Not on file     History   Drug use: Unknown     Marital Status: Unknown     Medications:  Current Facility-Administered Medications   Medication Dose Route Frequency    chlorhexidine (PERIDEX) 0 12 % oral rinse 15 mL  15 mL Swish & Spit Q12H [address]    fentaNYL 1250 mcg in sodium chloride 0 9% 125mL drip  50 mcg/hr Intravenous Continuous    heparin (porcine) subcutaneous injection 5,000 Units  5,000 Units Subcutaneous Q8H [address]    levETIRAcetam (KEPPRA) 1,000 mg in sodium chloride 0 9 % 100 mL IVPB  1,000 mg Intravenous Q12H [address]    LORazepam (ATIVAN) 2 mg/mL injection **AcuDose Override Pull**        LORazepam (ATIVAN) 2 mg/mL injection **AcuDose Override Pull**        LORazepam (ATIVAN) 2 mg/mL injection 2 mg  2 mg Intravenous Q6H PRN    pantoprazole (PROTONIX) injection 40 mg  40 mg Intravenous Q24H ABELINO    potassium chloride 20 mEq IVPB (premix)  20 mEq Intravenous Q2H    propofol (DIPRIVAN) 1000 mg in 100 mL infusion (premix)  5-50 mcg/kg/min Intravenous Titrated    propofol (DIPRIVAN) 200 MG/20ML bolus injection 100 mg  100 mg Intravenous Once    sodium chloride 0 9 % bolus 1,000 mL  1,000 mL Intravenous Once    sodium chloride 0 9 % infusion  125 mL/hr Intravenous Continuous     Home medications:  Prior to Admission medications    Not on File     Allergies:  Not on File     ROS:   Review of Systems   Unable to perform ROS: Intubated        Vitals:  Vitals:    18 0244 18 0301 18 0349 18 0410   BP:  137/70 142/85 156/93   Pulse:  (!) 151 (!) 133 (!) 133   Resp:  16 22 16   Temp:  100 2 °F (37 9 °C) (!) 100 6 °F (38 1 °C) (!) 100 8 °F (38 2 °C)   TempSrc:  Probe  Probe   SpO2: 94% 98% 99% 100%   Weight:  86 2 kg (190 lb)       Temperature:   Temp (24hrs), Av 5 °F (38 1 °C), Min:100 2 °F (37 9 °C), Max:100 8 °F (38 2 °C)    Current: Temperature: (!) 100 8 °F (38 2 °C)     Weights:   IBW: -88 kg  There is no height or weight on file to calculate BMI  Hemodynamic Monitoring:  N/A     Non-Invasive/Invasive Ventilation Settings:  Respiratory    Lab Data (Last 4 hours)    None         O2/Vent Data (Last 4 hours)       0244           Vent Mode AC/VC       Resp Rate (BPM) (BPM) 14       Vt (mL) (mL) 470       FIO2 (%) (%) 100       PEEP (cmH2O) (cmH2O) 8       MV 7 18                 No results found for: PHART, LVV4OOI, PO2ART, VSD5FCG, Y3UQAEIT, BEART, SOURCE  SpO2: SpO2: 100 %     Physical Exam:  Physical Exam   Constitutional: He appears well-developed and well-nourished  No distress  He is sedated and intubated  HENT:   Head: Normocephalic and atraumatic  Eyes: Conjunctivae are normal  Pupils are equal, round, and reactive to light  Neck: No JVD present  No tracheal deviation present  Cardiovascular: Regular rhythm, normal heart sounds and intact distal pulses  Tachycardia present  Pulmonary/Chest: Effort normal and breath sounds normal  No stridor  He is intubated  Abdominal: Soft  He exhibits no distension  Musculoskeletal: He exhibits no edema  Neurological: He is unresponsive  He exhibits normal muscle tone  GCS eye subscore is 1  GCS verbal subscore is 1  GCS motor subscore is 4  Skin: He is not diaphoretic  Nursing note and vitals reviewed  Labs:    Results from last 7 days  Lab Units 03/26/18  0304   WBC Thousand/uL 12 44*   HEMOGLOBIN g/dL 10 6*   HEMATOCRIT % 34 6*   PLATELETS Thousands/uL 71*   NEUTROS PCT % 29*   MONOS PCT % 6        Results from last 7 days  Lab Units 03/26/18  0304   SODIUM mmol/L 137   POTASSIUM mmol/L 3 6   CHLORIDE mmol/L 100   CO2 mmol/L 10*   BUN mg/dL 9   CREATININE mg/dL 1 52*   CALCIUM mg/dL 8 9   TOTAL PROTEIN g/dL 8 9*   BILIRUBIN TOTAL mg/dL 0 42   ALK PHOS U/L 143*   ALT U/L 39   AST U/L 101*   GLUCOSE RANDOM mg/dL 138                    Results from last 7 days  Lab Units 03/26/18  0304   LACTIC ACID mmol/L 20 8*     No results found for: TROPONINI     Imaging:     3/26 CTH: 1   Status post right craniectomy and there is encephalomalacia within the right temporal lobe and bilateral frontal lobes which may be sequela of prior injury  2   No acute intracranial hemorrhage, midline shift, or mass effect  3   Chronic small vessel ischemic changes    3/26 CT C-spine: No cervical spine fracture or traumatic malalignment  I have personally reviewed pertinent reports  and I have personally reviewed pertinent films in PACS  EKG: Sinus tachycardia with diffuse ST depressions likely demand ischemia This was personally reviewed by myself  Micro:  No results found for: Dariusz Ramirez SPUTUMCULTUR    Assessment: Middle aged male admitted to the ICU for acute encephalopathy and acute hypoxic respiratory failure  Plan:                  Neuro: Acute encephalopathy: questionable seizure activity vs intoxication  History of right craniectomy as seen on CT   Consider EMU if no improvement in neuro status  Keppra BID  Neuro checks Q1H  CV: Demand ischemia on EKG: IV hydration  Cardiac monitoring  Lung: Acute hypoxic respiratory failure: intubated on vent  Pulmonary toilet  Respiratory protocol  Wean FiO2 as tolerated  GI: No acute issues  Protonix for GI prophylaxis                 FEN: Fluids: normal saline at 125 mL/hr  Electrolytes: Potassium 3 6  Replete with 40 mEq KCl  Nutrition: NPO  Consider starting tube feeds if no improvement in neuro status                 : SARA: IV rehydration  Monitor urine output  Repeat BMP  ID: No acute issues  Elevated lactate likely secondary to seizure activity  Monitor for fever or worsening leukocytosis  Heme: No acute issues                 Endo: No acute issues  Msk/Skin: Chronic left upper extremity wound: monitor for signs of cellulitis  Wound care  Frequent repositioning to avoid skin breakdown                 Disposition: Admit to ICU     VTE Pharmacologic Prophylaxis: Heparin  VTE Mechanical Prophylaxis: sequential compression device     Invasive lines and devices: Invasive Devices     Peripheral Intravenous Line            Peripheral IV 03/26/18 Left Wrist less than 1 day    Peripheral IV 03/26/18 Right Arm less than 1 day    Peripheral IV 03/26/18 Right Wrist less than 1 day                 Code Status: Level 1 - Full Code  POA:    POLST:       Given critical illness, patient length of stay will require greater than two midnights  Portions of the record may have been created with voice recognition software  Occasional wrong word or "sound a like" substitutions may have occurred due to the inherent limitations of voice recognition software  Read the chart carefully and recognize, using context, where substitutions have occurred          Carlos Olivo MD

## 2018-03-26 NOTE — PROGRESS NOTES
Progress Note - ICU Transfer to Step down/med  surg  - Jony Ahr 43 y o  male MRN: [id number]    Unit/Bed#: MICU 01 Encounter: [id number]      Code Status: Level 1 - Full Code    Date of ICU admission: 3/26/18    Reason for ICU adm: acute encephalopathy    Active problems:   Patient Active Problem List   Diagnosis    Acute encephalopathy    Hypertension    Encephalomalacia    Head trauma    Status post craniectomy     Neuro:  Acute encephalopathy, improved  -Seizure activity vs  Intoxication vs  Alcohol withdrawal   -CIWA currently 3   -Loaded with 1 5g keppra  Continue keppra 1g iv bid   -Continue thiamine and folate    History of right craniectomy  -Performed 2 years ago, after an alcohol intoxication induced fall   -Encephalomalacia seen on CT scan performed 3/26      CV:   Hypertension  -Will start amlodipine 5mg po daily  Continue hydralazine prn      Pulm:   -No acute issues  S/p extubation      GI:   PPI Prophylaxis with protonix     Elevated LFTs  -On admission, , ALT 39, AlkP 143, Prot 8 9   -Likely secondary to etoh abuse  EtOH 17      :   SARA on admission, resolved  -Cr on admission 1 52 improved to 0 95   -Will d/c fluids and remove lucia catheter      F/E/N:   Fluids: none  Electrolytes: wnl  Nutrition: cleared for regular house diet w/ thin liquids     ID:   Fever of unknown origin  -Likely secondary to agitation vs  Alcohol withdrawal   -WBC 12 -> 6  Patient did become febrile to 102 but has since improved to 100  -Currently no identifiable source of infection  CXR shows no evidence of pneumonia  Consider sending for UA and/or blood cultures if he remains febrile       Heme:   Anemia  -Hgb 10 6 -> 9 9  Drop likely dilutional with unknown baseline   -Likely secondary to alcohol abuse       DVT ppx  -Continue heparin and SCDs  Thrombocytopenia  -Likely secondary to cirrhosis    -Monitor platelet count     Endo:   -No acute issues                Msk/Skin:   -Chronic appearing LUE wound   -Continue local wound care and repositioning      Disposition: Transfer to med/surg    Summary of clinical course:   Dawson Bowser is a 43year old male with PMH of HTN, etoh abuse, alcohol induced fall with subsequent head trauma and craniectomy 2 years ago, brought in as a Magdiel Castañeda unresponsive by EMS from the [facility] with possible seizure activity  In the ED, he was febrile to 101 1, tachycardic to 150s, hypertensive to 150s  Labs notable for etOH level 17  bicarb 10, AG 27, cr 1 52, , lactate 2 1, WBC 12  utox negative  CT head showed right craniectomy with encephalomalacia, chronic ischemic changes, no acute ICH or mass effect  CT cervical spine shows no fracture or trauma  Received 6mg ativan, 2L saline, intubated and sedated with propofol and fentanyl, started on NS @ 125/hr  CXR post-intubation showed ETT in position and no consolidation  Transferred to the MICU  Several hours later, his lab values significantly improved, with resolution of lactic acidosis, anion gap, SARA, and leukocytosis  He was subsequently extubated but remained somnolent throughout the morning  Eventually patient was awake enough to provide basic information, such as his name, , medical history  He states that he recently came over from [facility] and is currently homeless  The rest of his family lives in [facility]; he has several friends in the [facility] but does not know their contact information  He occasionally goes to [place] when he "gets a free bus pass"  Otherwise he denied any seizure history, medications  Patient passed an official swallow eval in the afternoon  He remained hypertensive and tachycardic throughout the day, but it subsequently improved after starting prn labetalol  He also developed a fever to 102 6 which subsequently resolved after administration of tylenol and cooling protocol  Patient was medically stable for transfer to med surg later in the early evening       Recent or scheduled procedures: Intubated and subsequently extubated on 3/26    Outstanding/pending diagnostics:   none    Hospital discharge planning:    Patient does not have a PCP  He is currently homeless and recently arrived here from [facility]  No family members in the [address] [facility]

## 2018-03-26 NOTE — ED ATTENDING ATTESTATION
Divya Frost MD, saw and evaluated the patient  I have discussed the patient with the resident/non-physician practitioner and agree with the resident's/non-physician practitioner's findings, Plan of Care, and MDM as documented in the resident's/non-physician practitioner's note, except where noted  All available labs and Radiology studies were reviewed  At this point I agree with the current assessment done in the Emergency Department  I have conducted an independent evaluation of this patient a history and physical is as follows:    Patient presents hypoxic, has history of alcohol withdrawal seizures, EMS states patient intoxicated and likely had seizure  No further history available  On arrival, patient hypoxic with sats in the 70s, tachycardic with heart rate of 160, patient appears to be purposeful, clawing at his genitals, has evidence of tongue biting  Fingerstick adequate, decision made to intubate patient for airway protection  After intubation, patient with roving eye movements  Pupils reactive to light  No signs of trauma to the head or neck  Oropharynx without evidence of tongue biting  Trachea midline  Heart tachycardic without murmurs, rubs, or gallops  Lungs coarse bilaterally  Abdomen soft with no masses and no distention  Extremities intact with good pulses  Neurologically, patient is sedated on the vent  Back exam unremarkable  Impression: Alcohol withdrawal seizures, hypoxia  Plan to CT head and neck, labs, will admit to the hospital   Patient required 40 minutes of bedside critical care time outside of separately billable procedures      Critical Care Time  CritCare Time    Procedures

## 2018-03-26 NOTE — PROGRESS NOTES
Patient was extubated later in the morning, became more responsive  He currently only nods or shakes his head, speaks one word sentences  He conveyed that he understands [place] and knows little [place]  He wrote down his name as Marisabel Beckwith

## 2018-03-26 NOTE — ED PROCEDURE NOTE
Procedure  Intubation  Date/Time: 3/26/2018 5:08 AM  Performed by: Leslie De Guzman  Authorized by: Eneida Carter     Patient location:  ED  Consent:     Consent obtained:  Emergent situation  Pre-procedure details:     Patient status:  Unresponsive    Mallampati score:  3    Pretreatment medications:  Etomidate    Paralytics:  Succinylcholine  Indications:     Indications for intubation: airway protection and hypoxemia    Procedure details:     Preoxygenation:  Nonrebreather mask    CPR in progress: no      Intubation method:  Oral    Oral intubation technique:  Glidescope    Laryngoscope blade: Mac 4    Tube size (mm):  8 0    Tube type:  Cuffed    Number of attempts:  1    Ventilation between attempts: no      Cricoid pressure: no      Tube visualized through cords: yes    Placement assessment:     ETT to lip:  22    ETT to teeth:  21    Tube secured with:  ETT uribe    Breath sounds:  Equal and absent over the epigastrium    Placement verification: chest rise, condensation, CXR verification, direct visualization, equal breath sounds, ETCO2 detector, tube exhalation and capnography      CXR findings:  ETT in proper place  Post-procedure details:     Patient tolerance of procedure:   Tolerated well, no immediate complications                     Bebeto Lynch DO  03/26/18 0510

## 2018-03-26 NOTE — RESPIRATORY THERAPY NOTE
RT Protocol Note  Tau Tau One [place] 80 y o  male MRN: [id number]  Unit/Bed#: AMAIRANI Encounter: [id number]    Assessment    Principal Problem:    Acute respiratory failure with hypoxia ([address] )  Active Problems:    Acute encephalopathy      Home Pulmonary Medications:  NA    No past medical history on file  Social History     Social History    Marital status: Unknown     Spouse name: N/A    Number of children: N/A    Years of education: N/A     Social History Main Topics    Smoking status: Not on file    Smokeless tobacco: Not on file    Alcohol use Not on file    Drug use: Unknown    Sexual activity: Not on file     Other Topics Concern    Not on file     Social History Narrative    No narrative on file       Subjective         Objective    Physical Exam:   Assessment Type: (P) Assess only  General Appearance: (P) Sedated  Respiratory Pattern: (P) Assisted  Chest Assessment: (P) Chest expansion symmetrical  Bilateral Breath Sounds: (P) Coarse    Vitals:  Blood pressure 156/93, pulse (!) 133, temperature (!) 100 8 °F (38 2 °C), temperature source Probe, resp  rate 16, weight 86 2 kg (190 lb), SpO2 (P) 100 %  Imaging and other studies: I have personally reviewed pertinent reports  Plan    Respiratory Plan: (P) Vent/NIV/HFNC        Resp Comments: (P) Pt not in any respiratory distress  Pt on ventilator  Uanble to obtain history from pt or chart  Breathe sounds are diminished and slightly coarse  No interventions started at this time

## 2018-03-26 NOTE — SPEECH THERAPY NOTE
Speech Language/Pathology  Speech/Language Pathology  Assessment    Patient Name: Rachael Cano One [place] And [place]  Today's Date: 3/26/2018     Problem List  Patient Active Problem List   Diagnosis    Acute encephalopathy     Alisia Cardenas brought in unresponsive by EMS from the [facility] with possible seizure activity  Patient received ativan in the ED and was intubated for hypoxemia  Patient moving all 4 extremities in the ED with purposeful movements prior to sedation  CT head shows prior right craniectomy with encephalomalacia, but no acute findings  Patient is unable to provide any history  Pt was intubated for less than 24 hours  Now alert, writes his name, indicates he knows very little [place]       Reason for consult:  R/o aspiration  Determine safest and least restrictive diet  Change in mental status    Current diet:  NPO  Premorbid diet[de-identified]  unknown  O2 requirement:  NC  Voice/Speech:  Low volume, mild hoarse, mumbled  Pt does not speak english  Social:  Unknown- states he is from [facility], does not state where he lives now, if he has family here  He repeats that he is in the hospital   No family present  Follows commands: For basic oral mech yes  Cognitive Status:  Alert, drowsy  Oral mech exam:  Full dentition  Full symmetry      Items administered:  Puree, soft solid, hard solid, 8 oz thin liquids by cup  Oral stage:  Lip closure: wfl  Mastication: slow but wfl  Bolus formation: wfl  Bolus control: wfl  Transfer: timely  Oral residue: none noted      Pharyngeal stage:  Swallow promptness: +  Laryngeal rise: wfl  No coughing or wet voicing  Secondary swallows: noted periodically    Esophageal stage:  No s/s reported    Summary:  Pt presents w/ functional swallow to begin a diet  Pt is more alert, better able to converse but still cannot recall where he lives        Recommendations:  Diet: regular   Liquid: thin  Meds: as tolerated  Supervision: assist as able  Positioning:Upright    Therapy Prognosis: fair  Prognosis considerations: ?able family support available  Frequency: x1 w/ meal  Eval only, No f/u tx indicated  Aspiration precautions posted    Results d/w:  Pt, nursing, family, physician, dietician      Goal(s):    Pt will tolerate least restrictive diet w/out s/s aspiration or oral/pharyngeal difficulties

## 2018-03-27 ENCOUNTER — APPOINTMENT (INPATIENT)
Dept: RADIOLOGY | Facility: HOSPITAL | Age: 43
DRG: 208 | End: 2018-03-27

## 2018-03-27 PROBLEM — I96 NECROTIC ESCHAR (HCC): Status: ACTIVE | Noted: 2018-03-27

## 2018-03-27 LAB
ANION GAP SERPL CALCULATED.3IONS-SCNC: 8 MMOL/L (ref 4–13)
BASOPHILS # BLD AUTO: 0.01 THOUSANDS/ΜL (ref 0–0.1)
BASOPHILS NFR BLD AUTO: 0 % (ref 0–1)
BUN SERPL-MCNC: 6 MG/DL (ref 5–25)
CALCIUM SERPL-MCNC: 8.7 MG/DL (ref 8.3–10.1)
CHLORIDE SERPL-SCNC: 102 MMOL/L (ref 100–108)
CO2 SERPL-SCNC: 27 MMOL/L (ref 21–32)
CREAT SERPL-MCNC: 0.59 MG/DL (ref 0.6–1.3)
EOSINOPHIL # BLD AUTO: 0.07 THOUSAND/ΜL (ref 0–0.61)
EOSINOPHIL NFR BLD AUTO: 1 % (ref 0–6)
ERYTHROCYTE [DISTWIDTH] IN BLOOD BY AUTOMATED COUNT: 15.8 % (ref 11.6–15.1)
GFR SERPL CREATININE-BSD FRML MDRD: 125 ML/MIN/1.73SQ M
GLUCOSE SERPL-MCNC: 89 MG/DL (ref 65–140)
HCT VFR BLD AUTO: 30.3 % (ref 36.5–49.3)
HGB BLD-MCNC: 9.6 G/DL (ref 12–17)
LYMPHOCYTES # BLD AUTO: 0.9 THOUSANDS/ΜL (ref 0.6–4.47)
LYMPHOCYTES NFR BLD AUTO: 11 % (ref 14–44)
MAGNESIUM SERPL-MCNC: 1.9 MG/DL (ref 1.6–2.6)
MCH RBC QN AUTO: 27.7 PG (ref 26.8–34.3)
MCHC RBC AUTO-ENTMCNC: 31.7 G/DL (ref 31.4–37.4)
MCV RBC AUTO: 88 FL (ref 82–98)
MONOCYTES # BLD AUTO: 0.35 THOUSAND/ΜL (ref 0.17–1.22)
MONOCYTES NFR BLD AUTO: 4 % (ref 4–12)
NEUTROPHILS # BLD AUTO: 6.71 THOUSANDS/ΜL (ref 1.85–7.62)
NEUTS SEG NFR BLD AUTO: 84 % (ref 43–75)
NRBC BLD AUTO-RTO: 0 /100 WBCS
PHOSPHATE SERPL-MCNC: 2.6 MG/DL (ref 2.7–4.5)
PLATELET # BLD AUTO: 50 THOUSANDS/UL (ref 149–390)
PMV BLD AUTO: 10.4 FL (ref 8.9–12.7)
POTASSIUM SERPL-SCNC: 3.4 MMOL/L (ref 3.5–5.3)
RBC # BLD AUTO: 3.46 MILLION/UL (ref 3.88–5.62)
SODIUM SERPL-SCNC: 137 MMOL/L (ref 136–145)
WBC # BLD AUTO: 8.06 THOUSAND/UL (ref 4.31–10.16)

## 2018-03-27 PROCEDURE — 99255 IP/OBS CONSLTJ NEW/EST HI 80: CPT | Performed by: PSYCHIATRY & NEUROLOGY

## 2018-03-27 PROCEDURE — 83735 ASSAY OF MAGNESIUM: CPT | Performed by: STUDENT IN AN ORGANIZED HEALTH CARE EDUCATION/TRAINING PROGRAM

## 2018-03-27 PROCEDURE — 76705 ECHO EXAM OF ABDOMEN: CPT

## 2018-03-27 PROCEDURE — 85025 COMPLETE CBC W/AUTO DIFF WBC: CPT | Performed by: STUDENT IN AN ORGANIZED HEALTH CARE EDUCATION/TRAINING PROGRAM

## 2018-03-27 PROCEDURE — 84100 ASSAY OF PHOSPHORUS: CPT | Performed by: STUDENT IN AN ORGANIZED HEALTH CARE EDUCATION/TRAINING PROGRAM

## 2018-03-27 PROCEDURE — 80048 BASIC METABOLIC PNL TOTAL CA: CPT | Performed by: STUDENT IN AN ORGANIZED HEALTH CARE EDUCATION/TRAINING PROGRAM

## 2018-03-27 RX ORDER — LORAZEPAM 2 MG/ML
1 INJECTION INTRAMUSCULAR EVERY 4 HOURS PRN
Status: DISCONTINUED | OUTPATIENT
Start: 2018-03-27 | End: 2018-03-28

## 2018-03-27 RX ORDER — ACETAMINOPHEN 325 MG/1
650 TABLET ORAL EVERY 12 HOURS PRN
Status: DISCONTINUED | OUTPATIENT
Start: 2018-03-27 | End: 2018-04-11 | Stop reason: HOSPADM

## 2018-03-27 RX ADMIN — LEVETIRACETAM 1000 MG: 500 TABLET ORAL at 21:32

## 2018-03-27 RX ADMIN — HEPARIN SODIUM 5000 UNITS: 5000 INJECTION, SOLUTION INTRAVENOUS; SUBCUTANEOUS at 05:51

## 2018-03-27 RX ADMIN — ACETAMINOPHEN 650 MG: 325 TABLET, FILM COATED ORAL at 00:15

## 2018-03-27 RX ADMIN — ACETAMINOPHEN 650 MG: 325 TABLET, FILM COATED ORAL at 21:30

## 2018-03-27 RX ADMIN — HEPARIN SODIUM 5000 UNITS: 5000 INJECTION, SOLUTION INTRAVENOUS; SUBCUTANEOUS at 21:30

## 2018-03-27 RX ADMIN — AMLODIPINE BESYLATE 5 MG: 5 TABLET ORAL at 08:04

## 2018-03-27 RX ADMIN — Medication 400 MCG: at 08:04

## 2018-03-27 RX ADMIN — POTASSIUM PHOSPHATE, MONOBASIC AND POTASSIUM PHOSPHATE, DIBASIC 12 MMOL: 224; 236 INJECTION, SOLUTION INTRAVENOUS at 10:29

## 2018-03-27 RX ADMIN — Medication 100 MG: at 08:04

## 2018-03-27 RX ADMIN — PANTOPRAZOLE SODIUM 40 MG: 40 TABLET, DELAYED RELEASE ORAL at 05:51

## 2018-03-27 RX ADMIN — LEVETIRACETAM 1000 MG: 500 TABLET ORAL at 08:04

## 2018-03-27 NOTE — PROGRESS NOTES
IM Residency Progress Note   Unit/Bed#: [facility] 920-01 Encounter: [id number]  SOD Team B       Yue Feldman 43 y o  male [phone]    Hospital Stay Days: 1      Assessment/Plan:    Principal Problem:    Acute encephalopathy  Active Problems:    Hypertension    Encephalomalacia    Head trauma    Status post craniectomy    Acute encephalopathy 2/2 Seizure Activity secondary to encephalomalacia vs Intoxication vs Alcohol Withdrawal vs Hepatic Encephalopathy - Ast, ALT ratio 2:1  Aox2  Improved  mild tremors  Does have a history of 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  Encephalomalacia seen on CT scan on 03/26  · Keppra 1 g po b i d  For now  · Continue thiamine and folate  · Ativan 1 mg q 6 hours p r n  for seizures  · Continue with regular diet  · Neurology consult for optimization of AEDs and outpatient follow-up  · F/u ammonia level, am labs  · Hold off on Pt/OT  As patient likely does not need     HTN  Currently Normotensive  · Cont amlodipine 5 mg p o  Daily  · Labetalol 10 mg q 6 hours p r n  for systolic blood pressure greater than 160     Elevated LFT's on admission  On admission , ALT 39, alk-phos 143  2:1 ratio  Likely in the setting of alcohol abuse and increased CK (due to immobility)  · Counseled on alcohol cessation  · F/u ammonia, pt-INR in am, other labs     SARA  Resolved  Cr initially 1 52 but improved to 0 95 with fluids  · Monitor BMPs     Anemia/Thrombocytopenia  Hemoglobin currently 9 9 and platelets currently 48  Unknown baseline  Could be in the setting of liver disease (alcoholic cirrhosis?)  No signs of active bleeding at the moment  · Will get RUQ  hold off  On GI consult for now  · Transfuse for hemoglobin less than 7 or symptomatic anemia  · Transfuse platelets less than 33,211     Fever  Currently afebrile, but was previously spiking temps up to 102  6   Likely secondary to agitation vs alcohol withdrawal, but no true clear etiology at this time  WBC count down from 12 to 6 patient  No identifiable source of infection  Chest x-ray shows no evidence of pneumonia  · Tylenol 650 mg q 6 hours p r n  · Consider UA and her blood culture if he remains febrile    Necrotic eschar on left forarm - unclear etiology  Pt notes it happened from trauma 3 weeks ago  - f/u with wound consult  - will likely need debridement  Will hold off on plastic surgery consult for now  Disposition:  Continue medical management    Subjective:   Patient is a very poor historian  Patient tells me [place] the park yesterday  Patient tells me that he has a history of 6 seizures in the past but has never taken medications  Patient told me that he does not drink at home however household by the nurse that upon her questioning earlier in the day he drank yesterday  He told me that his last drink was 2 years ago again he is unreliable historian  Patient is from [facility] and has no insurance  Denies any other past medical history  Has not seen a physician on a regular basis  Vitals: Temp (24hrs), Av 3 °F (37 9 °C), Min:98 1 °F (36 7 °C), Max:102 6 °F (39 2 °C)  Current: Temperature: 98 6 °F (37 °C)  Vitals:    18 2346 18 0300 18 0716 18 1033   BP: 133/76 127/79 141/69    BP Location: Right arm Right arm Right arm    Pulse: 99 101 96    Resp:     Temp: 100 °F (37 8 °C) 98 1 °F (36 7 °C) 98 6 °F (37 °C)    TempSrc: Oral Oral Oral    SpO2: 99% 99% 98%    Weight:    74 3 kg (163 lb 12 8 oz)   Height:    5' (1 524 m)    Body mass index is 31 99 kg/m²  I/O last 24 hours: In:  4 [P O :150; I V :1560 6; IV Piggyback:285 8]  Out: 3450 [Urine:3050; Emesis/NG output:400]      Physical Exam:  Physical Exam   Constitutional: No distress  HENT:   Head: Normocephalic and atraumatic  Eyes: EOM are normal  Pupils are equal, round, and reactive to light  Cardiovascular: Normal rate, regular rhythm and normal heart sounds  No murmur heard  Pulmonary/Chest: Effort normal and breath sounds normal  He has no wheezes  He has no rales  Abdominal: Soft  Bowel sounds are normal  He exhibits no distension  There is no tenderness  There is no rebound  Neurological: He is alert  Aox2  Cant rememebr the year  Has mild hand tremors  Skin: Skin is warm  He is not diaphoretic  Dark pigmented hands b/l  Dark necrotic eschar on left forearm  Non tender  Good radial pulses  Good cap refill in that arm  No similar lesions otehrwise  Has hyperkeratotic nails on feet b/l  Vitals reviewed      Invasive Devices     Peripheral Intravenous Line            Peripheral IV 03/26/18 Left Wrist 1 day                          Labs:   Recent Results (from the past 24 hour(s))   CBC and differential    Collection Time: 03/27/18  5:37 AM   Result Value Ref Range    WBC 8 06 4 31 - 10 16 Thousand/uL    RBC 3 46 (L) 3 88 - 5 62 Million/uL    Hemoglobin 9 6 (L) 12 0 - 17 0 g/dL    Hematocrit 30 3 (L) 36 5 - 49 3 %    MCV 88 82 - 98 fL    MCH 27 7 26 8 - 34 3 pg    MCHC 31 7 31 4 - 37 4 g/dL    RDW 15 8 (H) 11 6 - 15 1 %    MPV 10 4 8 9 - 12 7 fL    Platelets 50 (L) 869 - 390 Thousands/uL    nRBC 0 /100 WBCs    Neutrophils Relative 84 (H) 43 - 75 %    Lymphocytes Relative 11 (L) 14 - 44 %    Monocytes Relative 4 4 - 12 %    Eosinophils Relative 1 0 - 6 %    Basophils Relative 0 0 - 1 %    Neutrophils Absolute 6 71 1 85 - 7 62 Thousands/µL    Lymphocytes Absolute 0 90 0 60 - 4 47 Thousands/µL    Monocytes Absolute 0 35 0 17 - 1 22 Thousand/µL    Eosinophils Absolute 0 07 0 00 - 0 61 Thousand/µL    Basophils Absolute 0 01 0 00 - 0 10 Thousands/µL   Basic metabolic panel    Collection Time: 03/27/18  5:37 AM   Result Value Ref Range    Sodium 137 136 - 145 mmol/L    Potassium 3 4 (L) 3 5 - 5 3 mmol/L    Chloride 102 100 - 108 mmol/L    CO2 27 21 - 32 mmol/L    Anion Gap 8 4 - 13 mmol/L    BUN 6 5 - 25 mg/dL    Creatinine 0 59 (L) 0 60 - 1 30 mg/dL Glucose 89 65 - 140 mg/dL    Calcium 8 7 8 3 - 10 1 mg/dL    eGFR 125 ml/min/1 73sq m   Magnesium    Collection Time: 03/27/18  5:37 AM   Result Value Ref Range    Magnesium 1 9 1 6 - 2 6 mg/dL   Phosphorus    Collection Time: 03/27/18  5:37 AM   Result Value Ref Range    Phosphorus 2 6 (L) 2 7 - 4 5 mg/dL     Radiology Results: I have personally reviewed pertinent reports  Other Diagnostic Testing:   I have personally reviewed pertinent reports          Active Meds:   Current Facility-Administered Medications   Medication Dose Route Frequency    acetaminophen (TYLENOL) tablet 650 mg  650 mg Oral Q6H PRN    amLODIPine (NORVASC) tablet 5 mg  5 mg Oral Daily    folic acid (FOLVITE) tablet 400 mcg  400 mcg Oral Daily    heparin (porcine) subcutaneous injection 5,000 Units  5,000 Units Subcutaneous Q8H [address]    labetalol (NORMODYNE) injection 10 mg  10 mg Intravenous Q6H PRN    levETIRAcetam (KEPPRA) tablet 1,000 mg  1,000 mg Oral Q12H ABELINO    LORazepam (ATIVAN) 2 mg/mL injection 1 mg  1 mg Intravenous Q6H PRN    pantoprazole (PROTONIX) EC tablet 40 mg  40 mg Oral Early Morning    potassium phosphate 12 mmol in sodium chloride 0 9 % 250 mL infusion  12 mmol Intravenous Once    thiamine (VITAMIN B1) tablet 100 mg  100 mg Oral Daily     VTE Pharmacologic Prophylaxis: Heparin  VTE Mechanical Prophylaxis: sequential compression device    Mode Lou MD

## 2018-03-27 NOTE — PROGRESS NOTES
Update from progress note earlier today:    - CIWA ~3  - C/w monitoring fever curve, will get blood cultures x2, and ua if patient spikes fever again  - differential for nectoric non tender eschar: trauma/burn (patient gives history of trauma 3 weeks ago) vs Infection vs arterial (less likely, as has good pulses, hepc?, Antiphospholipid syndrome?)  Infection differential includes (tick bite), although there is only one bite and not multiple, spider bite, unlikely deep fungal, unlikely calciphylaxis, unlikely anthrax  - TX as per wound care  May benefit from collagenase  Likely can hold off on debridement for now    - monitor vs picture in chart from ED yesterday

## 2018-03-27 NOTE — PROGRESS NOTES
[facility] Unit Transfer Note  Unit/Bed # @DBLINK ([facility],38809)@ Encounter: [id number]  SOD Team A          Manus Forget 43 y o  male [address] Problems: Principal Problem:    Acute encephalopathy  Active Problems:    Hypertension    Encephalomalacia    Head trauma    Status post craniectomy    Acute encephalopathy 2/2 Seizure Activity vs Intoxication vs Alcohol Withdrawal vs Hepatic Encephalopathy  Improved  CIWA currently 3  Does have a history of 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  Encephalomalacia seen on CT scan on 03/26  · Keppra 1 g po b i d   · Continue thiamine and folate  · Ativan 1 mg q 6 hours p r n  for seizures  · Passed speech/swallow eval, regular diet  · Consider neuro consult, will defer to day team    HTN  Currently Normotensive  · Cont amlodipine 5 mg p o  Daily  · Labetalol 10 mg q 6 hours p r n  for systolic blood pressure greater than 160    Elevated LFT's on admission  On admission , ALT 39, alk-phos 143  Likely in the setting of alcohol abuse and increased CK (due to immobility)  · Counseled on alcohol cessation    SARA  Resolved  Cr initially 1 52 but improved to 0 95 with fluids  · Monitor BMPs    Anemia/Thrombocytopenia  Hemoglobin currently 9 9 and platelets currently 48  Unknown baseline  Could be in the setting of liver disease (alcoholic cirrhosis?)  No signs of active bleeding at the moment  · Consider right upper quadrant ultrasound to evaluate liver  Will likely need GI follow-up  · Transfuse for hemoglobin less than 7 or symptomatic anemia  · Transfuse platelets less than 38,331    Fever  Currently afebrile, but was previously spiking temps up to 102  6  Likely secondary to agitation vs alcohol withdrawal, but no true clear etiology at this time  WBC count down from 12 to 6 patient  No identifiable source of infection  Chest x-ray shows no evidence of pneumonia  · Tylenol 650 mg q 6 hours p r n  · Consider UA and her blood culture if he remains febrile      VTE Pharmacologic Prophylaxis: Heparin  VTE Mechanical Prophylaxis: sequential compression device    Disposition: Patient requires Med/Surg    Hospital Course:  Patient 26-year-old male with past medical history of hypertension, alcohol abuse, alcohol-induced fall with subsequent head trauma and craniectomy years ago who is brought in unresponsive by EMS from the since could see no with possible seizure activity  In the ED he was afebrile to 101 1 degrees F, tachycardic to 150s, hypertensive to 150s  Labs were notable for an ethanol level of 17, bicarb 10, anion gap 27, creatinine 0 52, , lactate 2 1, WBC 12, U tox negative  CT head showed right craniectomy with encephalomalacia, chronic ischemic changes, no acute ICH or mass effect  CT cervical spine showed no fracture or trauma  Given 6 mg Ativan, 2 L saline, intubated and sedated with propofol and fentanyl, started on normal saline at 125 cc/hour  Chest x-ray post intubation showed ETT in position with no consolidation  Transferred to the MICU  Several hours later, his labs were improved significantly, with resolution of lactic acidosis, anion gap, acute kidney injury, leukocytosis  He was subsequently extubated the patient remained somnolent throughout the morning  Eventually patient was awake enough to provide basic information such as his name, date of birth, medical history  He states that he recently came over from [facility] and is currently homeless  The rest of his family lives in [place] and he has several friends in the [address]  but does not know the contact information  He goes to [facility] when he "gets a free bus pass"  Otherwise he denies any seizure history or home medication  Patient passed an official swallow eval in the afternoon  He remained hypertensive and tachycardic throughout the day but subsequently improved after starting p r n  labetalol    He also developed a fever of 102 6 which subsequently resolved with administration of Tylenol and cooling protocol  Patient was medically stable for transfer to [facility]  Subjective: Patient has no acute complaints  No pain  Currently did not want to continue out encouter as he was tired and wanted to go back to sleep  Objective:   Vitals:    03/26/18 1905 03/26/18 2140 03/26/18 2346 03/27/18 0300   BP: 127/82 125/75 133/76 127/79   BP Location:  Right arm Right arm Right arm   Pulse: (!) 106 102 99 101   Resp: 17 18 18 18   Temp: 100 4 °F (38 °C) 100 2 °F (37 9 °C) 100 °F (37 8 °C) 98 1 °F (36 7 °C)   TempSrc:  Oral Oral Oral   SpO2: 98% 100% 99% 99%   Weight:         I/O last 24 hours: In: 2910 6 [I V :1624 8; IV Piggyback:1285 8]  Out: 2961 [Urine:4950; Emesis/NG output:400]    Physical Exam   Constitutional: He appears well-developed and well-nourished  HENT:   Head: Normocephalic and atraumatic  Cardiovascular: Normal rate, regular rhythm, normal heart sounds and intact distal pulses  Exam reveals no gallop and no friction rub  No murmur heard  Pulmonary/Chest: Effort normal and breath sounds normal  No respiratory distress  He has no wheezes  He has no rales  Abdominal: Soft  Bowel sounds are normal  He exhibits no distension  There is no tenderness  There is no guarding  Musculoskeletal: He exhibits no edema or deformity  Skin: Skin is warm and dry  Vitals reviewed            Kishan Dhillon MD

## 2018-03-27 NOTE — CONSULTS
Consultation - Neurology   Daniel Perea 43 y o  male MRN: [id number]  Unit/Bed#: [facility] 920-01 Encounter: [id number]      Assessment/Plan   Assessment:  49-year-old male, with history of significant right hemisphere encephalomalacia status post right craniectomy, question of alcohol related seizures, who initially presented after losing consciousness at the [address] [place] [address] Ave  Unclear if these events simply attributable to alcohol use; patient does have evidence of bihemisphere encephalomalacia from a prior traumatic injury, which poses potential to produce seizure focus/cortical irritation  Plan:  1  Currently being maintained on Keppra 1000 mg twice daily  Discussed with case management checking affordability of AED's   2   Will obtain routine EEG to establish baseline epileptiform abnormalities, if present  3  Continuing Thiamine/Folic Acid, DT/Etoh withdrawal monitoring  4  Continued metabolic/infectious derangement monitoring/correction per primary team:   -Febrile state resolved since this morning, remains with resolved leukocytosis  Lower suspicion for CNS  process, can likely defer need for LP at this time and monitor clinical status  Recommend check UA and blood  cultures if febrile state returns  - US of RUQ pending with anemia/thrombocytopenia, monitoring blood counts/transfusion if needed  - Following ammonia, CBC/CMP, INR  5  Continued normotensive/eyglycemic goals per primary team    - On amlodipine, labetalol prn  6  Wound care consulted to evaluate necrotic eschar  7  Supportive care  8  Speech therapy following  9  Discussed plan of care with attending neurologist and primary team   Will continue to monitor neurologic status throughout workup      History of Present Illness     Reason for Consult / Principal Problem:  Seizure-like activity    HPI: Daniel Perea is a 43 y o  male with history of craniectomy, questionable history of alcohol-induced seizures who presents after initially being found unresponsive at the [facility] with concern of potential seizure-like activity  Patient initially was brought to the ED early yesterday morning after being [facility] the [facility]   Initially presented as unresponsive, hypoxic with oxygen saturations in the 70s, tachycardic  Neurologically was moving all 4 extremities the minimally responsive to sternal rub, exhibited dry blood around his mouth and clothing was wet/saturated with urine  He did receive Ativan in the ED (6 mg), was intubated for airway protection  Medical alcohol level of 17   Was given Keppra load and placed on maintenance Keppra 1000 mg every 12 hours, placed on folic acid and thiamine for alcohol withdrawal prevention  CT head revealing right craniectomy with encephalomalacia and chronic ischemic changes, without evidence of acute intracranial abnormality  Admitted to the MICU for care  Throughout the course of yesterday exhibited fever, max temp 102 6°  Patient later in the afternoon yesterday was extubated, becoming more responsive  He became more alert and perform staff of his name, date of birth and medical history  Review of notes reveal patient recently moved from [facility] to the [place] and is currently homeless  Patient was questioned today personally with the help of English speaking PCA  Patient states he is here because he had a fall in [facility], and has been at this hospital for the past 3 years  Chart review suggest patient recently moved here from [facility], but upon questioning patient he states he has been here the past 5 or 6 years  When also asked where patient lives he responds [place]  He has exhibited fluctuating stories to SOD and the nursing staff    He does not recall [place] at the casino prior to this presentation; and did voice to one staff member that he was at a park prior to coming to the hospital   He denied any history of seizure-like events in the past to this examiner; but he did voice to other staff members seizure-like activity has taken place on 5-6 prior instances  He states he was placed on an anti- epileptic medication in the past (unclear the reliability of patient's history giving)  He states he was never following with a neurologist in the past     Currently he denies any headache, neck pain or stiffness, dizziness/lightheaded sensation, abnormalities with vision or speech, focal weakness of an extremity or sensory changes, chest pain, shortness of breath  Patient states he drinks 2 beers every 3 days  Inpatient consult to Neurology  Consult performed by: Petar Enamorado ordered by: [employer]          Review of Systems   Constitutional: Negative  HENT: Negative for trouble swallowing and voice change  Eyes: Negative for photophobia and visual disturbance  Respiratory: Negative for shortness of breath  Cardiovascular: Negative for chest pain and palpitations  Gastrointestinal: Negative for abdominal pain, nausea and vomiting  Musculoskeletal: Negative for neck pain and neck stiffness  Skin: Negative  Neurological: Positive for seizures  Negative for dizziness, tremors, syncope, facial asymmetry, speech difficulty, weakness, light-headedness, numbness and headaches  Historical Information   Past Medical History:   Diagnosis Date    Encephalomalacia     Head trauma     Homelessness     Hypertension     Status post craniectomy      Past Surgical History:   Procedure Laterality Date    CRANIECTOMY       Social History   History   Alcohol Use    1 2 oz/week    2 Cans of beer per week     Comment: 2 cans of beer per week     History   Drug use: Unknown     History   Smoking Status    Not on file   Smokeless Tobacco    Not on file     Family History: No family history on file  Review of previous medical records was completed      Meds/Allergies   current meds:   Current Facility-Administered Medications   Medication Dose Route Frequency    acetaminophen (TYLENOL) tablet 650 mg  650 mg Oral Q6H PRN    amLODIPine (NORVASC) tablet 5 mg  5 mg Oral Daily    folic acid (FOLVITE) tablet 400 mcg  400 mcg Oral Daily    heparin (porcine) subcutaneous injection 5,000 Units  5,000 Units Subcutaneous Q8H [facility]    labetalol (NORMODYNE) injection 10 mg  10 mg Intravenous Q6H PRN    levETIRAcetam (KEPPRA) tablet 1,000 mg  1,000 mg Oral Q12H ABELINO    LORazepam (ATIVAN) 2 mg/mL injection 1 mg  1 mg Intravenous Q6H PRN    pantoprazole (PROTONIX) EC tablet 40 mg  40 mg Oral Early Morning    potassium phosphate 12 mmol in sodium chloride 0 9 % 250 mL infusion  12 mmol Intravenous Once    thiamine (VITAMIN B1) tablet 100 mg  100 mg Oral Daily    and PTA meds:   None       No Known Allergies    Objective   Vitals:Blood pressure 141/69, pulse 96, temperature 98 6 °F (37 °C), temperature source Oral, resp  rate 18, height 5' (1 524 m), weight 74 3 kg (163 lb 12 8 oz), SpO2 98 %  ,Body mass index is 31 99 kg/m²  Intake/Output Summary (Last 24 hours) at 03/27/18 1119  Last data filed at 03/27/18 0777   Gross per 24 hour   Intake             1210 ml   Output             1350 ml   Net             -140 ml       Invasive Devices: Invasive Devices     Peripheral Intravenous Line            Peripheral IV 03/26/18 Left Wrist 1 day                Physical Exam   Constitutional: He is oriented to person, place, and time  He appears well-developed and well-nourished  Overall poor hygiene   HENT:   Head: Normocephalic and atraumatic  Evidence of right tongue bite noted  Eyes: Conjunctivae and EOM are normal  Pupils are equal, round, and reactive to light  Scleral icterus is present  Neck: Normal range of motion  Neck supple  Cardiovascular: Regular rhythm  Tachycardia present  Pulmonary/Chest: Effort normal and breath sounds normal    Abdominal: Soft  Bowel sounds are normal    Musculoskeletal: Normal range of motion     Neurological: He is alert and oriented to person, place, and time  He has a normal Finger-Nose-Finger Test    Reflex Scores:       Patellar reflexes are 1+ on the right side and 1+ on the left side  Achilles reflexes are 1+ on the right side and 1+ on the left side  Skin: Skin is warm and dry  Necrotic scarring of the left forearm  Neurologic Exam     Mental Status   Oriented to person, place, and time  Patient is alert but fairly inattentive throughout personal examination  He is oriented to his name, and his birthday, states it is February and ended that its a Tuesday currently  Not oriented to the year  He states we are in a hospital, but cannot state which  He is able to identify simple colors and objects  He is able to follow simple commands, however more complex command following is inhibited by patient inattentiveness  He require some redirection and/repeated cuing with orientation questions and command following  Cranial Nerves     CN II   Visual fields full to confrontation  CN III, IV, VI   Pupils are equal, round, and reactive to light  Extraocular motions are normal      CN V   Facial sensation intact  CN VII   Facial expression full, symmetric  CN VIII   CN VIII normal      CN IX, X   CN IX normal    CN X normal      CN XI   CN XI normal      CN XII   CN XII normal      Motor Exam   Right arm pronator drift: absent  Left arm pronator drift: absent  He displays normal tone throughout  He displays intact and symmetric strength in the upper and lower extremities throughout, no evidence of motor focality/lateralization is appreciated  Sensory Exam   Light touch normal    Vibration normal      Inattentiveness inhibits fully accurate temperature sensation testing  With light touch and vibratory testing he displays no gross sensory deficits in the upper/lower extremities       Gait, Coordination, and Reflexes     Coordination   Finger to nose coordination: normal    Tremor   Resting tremor: absent  Intention tremor: present (Some low amplitude bilateral upper extremity tremoring with outstretched arms )    Reflexes   Right patellar: 1+  Left patellar: 1+  Right achilles: 1+  Left achilles: 1+  Right plantar: equivocal  Left plantar: equivocal  Right ankle clonus: absent  Left ankle clonus: absent  Negative Kernig/Brudzinski's; full range of motion in the neck without evidence of neck stiffness  No clear signs of meningismus on examination         Lab Results:   CBC:   Results from last 7 days  Lab Units 03/27/18  0537 03/26/18  0602 03/26/18  0304   WBC Thousand/uL 8 06 6 26 12 44*   RBC Million/uL 3 46* 3 51* 3 77*   HEMOGLOBIN g/dL 9 6* 9 9* 10 6*   HEMATOCRIT % 30 3* 30 5* 34 6*   MCV fL 88 87 92   PLATELETS Thousands/uL 50* 48*  48* 71*   , BMP/CMP:   Results from last 7 days  Lab Units 03/27/18  0537 03/26/18  0602 03/26/18  0304   SODIUM mmol/L 137 136 137   POTASSIUM mmol/L 3 4* 4 1 3 6   CHLORIDE mmol/L 102 106 100   CO2 mmol/L 27 22 10*   ANION GAP mmol/L 8 8 27*   BUN mg/dL 6 8 9   CREATININE mg/dL 0 59* 0 95 1 52*   GLUCOSE RANDOM mg/dL 89 109 138   CALCIUM mg/dL 8 7 7 7* 8 9   AST U/L  --   --  101*   ALT U/L  --   --  39   ALK PHOS U/L  --   --  143*   TOTAL PROTEIN g/dL  --   --  8 9*   BILIRUBIN TOTAL mg/dL  --   --  0 42   EGFR ml/min/1 73sq m 125 46 26   , Vitamin B12:   , HgBA1C:   , TSH:   , Coagulation:   , Lipid Profile:   , Ammonia:   , Urinalysis:       Invalid input(s): URIBILINOGEN, Drug Screen:   Results from last 7 days  Lab Units 03/26/18  0307   BARBITURATE UR  Negative   BENZODIAZEPINE UR  Negative   THC UR  Negative   COCAINE UR  Negative   METHADONE URINE  Negative   OPIATE UR  Negative   PCP UR  Negative   , Medication Drug Levels:       Invalid input(s): CARBAMAZEPINE,  PHENOBARB, LACOSAMIDE, OXCARBAZEPINE  Imaging Studies: I have personally reviewed pertinent films in PACS   CT head 03/26/2018: 1   Status post right craniectomy and there is encephalomalacia within the right temporal lobe and bilateral frontal lobes which may be sequela of prior injury  2   No acute intracranial hemorrhage, midline shift, or mass effect  3   Chronic small vessel ischemic changes  CT cervical spine 03/26/2018: No cervical spine fracture or traumatic malalignment      Chest x-ray 03/26/2018: Endotracheal and nasogastric tubes as above without pneumothorax  No acute cardiopulmonary disease  EKG, Pathology, and Other Studies: I have personally reviewed pertinent reports  VTE Prophylaxis: Sequential compression device (Venodyne)  and Heparin    Code Status: Level 1 - Full Code    Counseling / Coordination of Care  Total time spent today 60 minutes  Greater than 50% of total time was spent with the patient and / or family counseling and / or coordination of care  A description of the counseling / coordination of care: Discussed plan of care with primary team and CM
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
## 2018-03-28 ENCOUNTER — APPOINTMENT (INPATIENT)
Dept: NEUROLOGY | Facility: AMBULATORY SURGERY CENTER | Age: 43
DRG: 208 | End: 2018-03-28

## 2018-03-28 PROBLEM — E83.39 HYPOPHOSPHATEMIA: Status: ACTIVE | Noted: 2018-03-28

## 2018-03-28 PROBLEM — E87.6 HYPOKALEMIA: Status: ACTIVE | Noted: 2018-03-28

## 2018-03-28 LAB
ALBUMIN SERPL BCP-MCNC: 3.7 G/DL (ref 3.5–5)
ALP SERPL-CCNC: 105 U/L (ref 46–116)
ALT SERPL W P-5'-P-CCNC: 31 U/L (ref 12–78)
AMMONIA PLAS-SCNC: 28 UMOL/L (ref 11–35)
ANION GAP SERPL CALCULATED.3IONS-SCNC: 11 MMOL/L (ref 4–13)
AST SERPL W P-5'-P-CCNC: 61 U/L (ref 5–45)
BASOPHILS # BLD AUTO: 0.03 THOUSANDS/ΜL (ref 0–0.1)
BASOPHILS NFR BLD AUTO: 0 % (ref 0–1)
BILIRUB SERPL-MCNC: 1.2 MG/DL (ref 0.2–1)
BUN SERPL-MCNC: 10 MG/DL (ref 5–25)
CALCIUM SERPL-MCNC: 9 MG/DL (ref 8.3–10.1)
CHLORIDE SERPL-SCNC: 100 MMOL/L (ref 100–108)
CO2 SERPL-SCNC: 25 MMOL/L (ref 21–32)
CREAT SERPL-MCNC: 0.67 MG/DL (ref 0.6–1.3)
EOSINOPHIL # BLD AUTO: 0.21 THOUSAND/ΜL (ref 0–0.61)
EOSINOPHIL NFR BLD AUTO: 3 % (ref 0–6)
ERYTHROCYTE [DISTWIDTH] IN BLOOD BY AUTOMATED COUNT: 15.6 % (ref 11.6–15.1)
GFR SERPL CREATININE-BSD FRML MDRD: 119 ML/MIN/1.73SQ M
GLUCOSE SERPL-MCNC: 96 MG/DL (ref 65–140)
HCT VFR BLD AUTO: 31.9 % (ref 36.5–49.3)
HGB BLD-MCNC: 10.2 G/DL (ref 12–17)
INR PPP: 1.02 (ref 0.86–1.16)
LYMPHOCYTES # BLD AUTO: 1.97 THOUSANDS/ΜL (ref 0.6–4.47)
LYMPHOCYTES NFR BLD AUTO: 25 % (ref 14–44)
MAGNESIUM SERPL-MCNC: 1.7 MG/DL (ref 1.6–2.6)
MCH RBC QN AUTO: 28.2 PG (ref 26.8–34.3)
MCHC RBC AUTO-ENTMCNC: 32 G/DL (ref 31.4–37.4)
MCV RBC AUTO: 88 FL (ref 82–98)
MONOCYTES # BLD AUTO: 0.62 THOUSAND/ΜL (ref 0.17–1.22)
MONOCYTES NFR BLD AUTO: 8 % (ref 4–12)
NEUTROPHILS # BLD AUTO: 5.14 THOUSANDS/ΜL (ref 1.85–7.62)
NEUTS SEG NFR BLD AUTO: 64 % (ref 43–75)
NRBC BLD AUTO-RTO: 0 /100 WBCS
PHOSPHATE SERPL-MCNC: 2.4 MG/DL (ref 2.7–4.5)
PLATELET # BLD AUTO: 73 THOUSANDS/UL (ref 149–390)
PMV BLD AUTO: 10.4 FL (ref 8.9–12.7)
POTASSIUM SERPL-SCNC: 3.2 MMOL/L (ref 3.5–5.3)
PROT SERPL-MCNC: 8.4 G/DL (ref 6.4–8.2)
PROTHROMBIN TIME: 13.4 SECONDS (ref 12.1–14.4)
RBC # BLD AUTO: 3.62 MILLION/UL (ref 3.88–5.62)
SODIUM SERPL-SCNC: 136 MMOL/L (ref 136–145)
WBC # BLD AUTO: 7.99 THOUSAND/UL (ref 4.31–10.16)

## 2018-03-28 PROCEDURE — 82140 ASSAY OF AMMONIA: CPT | Performed by: STUDENT IN AN ORGANIZED HEALTH CARE EDUCATION/TRAINING PROGRAM

## 2018-03-28 PROCEDURE — 83735 ASSAY OF MAGNESIUM: CPT | Performed by: STUDENT IN AN ORGANIZED HEALTH CARE EDUCATION/TRAINING PROGRAM

## 2018-03-28 PROCEDURE — 80053 COMPREHEN METABOLIC PANEL: CPT | Performed by: STUDENT IN AN ORGANIZED HEALTH CARE EDUCATION/TRAINING PROGRAM

## 2018-03-28 PROCEDURE — 99233 SBSQ HOSP IP/OBS HIGH 50: CPT | Performed by: PSYCHIATRY & NEUROLOGY

## 2018-03-28 PROCEDURE — 95816 EEG AWAKE AND DROWSY: CPT | Performed by: PSYCHIATRY & NEUROLOGY

## 2018-03-28 PROCEDURE — 95816 EEG AWAKE AND DROWSY: CPT

## 2018-03-28 PROCEDURE — 85610 PROTHROMBIN TIME: CPT | Performed by: STUDENT IN AN ORGANIZED HEALTH CARE EDUCATION/TRAINING PROGRAM

## 2018-03-28 PROCEDURE — 85025 COMPLETE CBC W/AUTO DIFF WBC: CPT | Performed by: STUDENT IN AN ORGANIZED HEALTH CARE EDUCATION/TRAINING PROGRAM

## 2018-03-28 PROCEDURE — 84100 ASSAY OF PHOSPHORUS: CPT | Performed by: STUDENT IN AN ORGANIZED HEALTH CARE EDUCATION/TRAINING PROGRAM

## 2018-03-28 RX ORDER — CARBAMAZEPINE 200 MG/1
200 TABLET ORAL 2 TIMES DAILY
Status: CANCELLED | OUTPATIENT
Start: 2018-03-28

## 2018-03-28 RX ORDER — LORAZEPAM 2 MG/ML
1 INJECTION INTRAMUSCULAR EVERY 4 HOURS PRN
Status: DISCONTINUED | OUTPATIENT
Start: 2018-03-28 | End: 2018-04-07

## 2018-03-28 RX ORDER — POTASSIUM CHLORIDE 20 MEQ/1
40 TABLET, EXTENDED RELEASE ORAL ONCE
Status: COMPLETED | OUTPATIENT
Start: 2018-03-28 | End: 2018-03-28

## 2018-03-28 RX ADMIN — Medication 100 MG: at 10:00

## 2018-03-28 RX ADMIN — Medication 400 MCG: at 10:01

## 2018-03-28 RX ADMIN — HEPARIN SODIUM 5000 UNITS: 5000 INJECTION, SOLUTION INTRAVENOUS; SUBCUTANEOUS at 05:08

## 2018-03-28 RX ADMIN — POTASSIUM PHOSPHATE, MONOBASIC AND POTASSIUM PHOSPHATE, DIBASIC 21 MMOL: 224; 236 INJECTION, SOLUTION INTRAVENOUS at 10:02

## 2018-03-28 RX ADMIN — PANTOPRAZOLE SODIUM 40 MG: 40 TABLET, DELAYED RELEASE ORAL at 05:08

## 2018-03-28 RX ADMIN — AMLODIPINE BESYLATE 5 MG: 5 TABLET ORAL at 10:01

## 2018-03-28 RX ADMIN — POTASSIUM CHLORIDE 40 MEQ: 1500 TABLET, EXTENDED RELEASE ORAL at 10:00

## 2018-03-28 RX ADMIN — HEPARIN SODIUM 5000 UNITS: 5000 INJECTION, SOLUTION INTRAVENOUS; SUBCUTANEOUS at 13:37

## 2018-03-28 RX ADMIN — HEPARIN SODIUM 5000 UNITS: 5000 INJECTION, SOLUTION INTRAVENOUS; SUBCUTANEOUS at 22:30

## 2018-03-28 RX ADMIN — LEVETIRACETAM 1000 MG: 500 TABLET ORAL at 10:00

## 2018-03-28 RX ADMIN — LORAZEPAM 1 MG: 2 INJECTION INTRAMUSCULAR; INTRAVENOUS at 16:56
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
## 2018-03-28 NOTE — PROGRESS NOTES
Rounding with [employer] from SOD, pt is confused and unable to make respond to questions  Will continue to monitor patient, use ativan as needed

## 2018-03-28 NOTE — PROGRESS NOTES
Patient is [place]-speaking and a  was used on all evaluations  On my evaluation from yesterday and today, patient still is acutely encephalopathic  AO x1/2  He gives in inappropriate answers when asked about questions  He does not know exactly where he is or the year or the month  He knows his name and that he is from [facility] and that he has a history of alcohol drinking and had an surgery on his head before in the past   On multiple occasions he has been disoriented however he has been able to be reoriented  Today he ripped out his Iv  Patient is being placed on one-to-one observation  Ideally with K[place]gyz-speaking P[facility]  There is an order for Ativan 1 mg p r n  for anxiety, seizures, agitation  I do not believe the patient has capacity at this moment and is still encephalopathic - he does not know the risks and benefits of his medical condition and treatment currently  He does not make consistent choices and rather does not make choices that are logical or reasonable - to not only myself but also multiple people including the nursing staff  He says he wants to leave occasionally but then decides he will stay  Is unclear what the patient's baseline mental status is  It is believed that his baseline may be altered currently has he likely had a seizure unwitnessed prior to admission the hospital   He is being evaluated by Neurology and anti epileptics are being optimized

## 2018-03-28 NOTE — PROGRESS NOTES
Progress Note - Neurology   Melissa Major 43 y o  male MRN: [id number]  Unit/Bed#: [facility] 920-01 Encounter: [id number]    Assessment/ Plan:  1)  Seizure disroder, likely related to chronic R hemispheric encephalomalacia s/p craniectomy, unclear etiology, or chronic bihemispheric encephalomalacia s/p trauma    -Routine EEG pending   -will discontinue Keppra and implement carbamazepine 200 mg p o  daily for seizure prophylaxis  This medication is on the target $4 list, and direct is a $10 for a 90 day supply    -will consider increase to t i d    -no further neurological recommendations at this time  If the patient is to remain in the [facility], and not go back to [place], will see patient in outpatient epilepsy Clinic  2)  History of extensive traumatic brain injury   -case management and SOD team assisting patient in aftercare arrangements    Subjective:   No acute events overnight  Patient continues to be a poor historian  He is alert and able to follow commands  Oriented to self and that he is in hospital, reports that he is in [place]  Secondary to patient's social situation, in which he is very unlikely to be able to afford name brain Keppra, will switch to carbamazepine 200 mg p o  b i d , which is on the Target $4 list   The patient was discussed with case management as well as SOD  A 12 point review of systems was attempted, however had limited ability to engage in conversation  Denies complaints       ROS:  See Subjective     Medications:  Scheduled Meds:  Current Facility-Administered Medications:  acetaminophen 650 mg Oral Q12H PRN Gris Mcnally MD    amLODIPine 5 mg Oral Daily Skylar Velasquez MD    folic acid 624 mcg Oral Daily Skylar Velasquez MD    heparin (porcine) 5,000 Units Subcutaneous [facility] Fior Paul MD    labetalol 10 mg Intravenous Q6H PRN Skylar Velasquez MD    levETIRAcetam 1,000 mg Oral Q12H [facility] Skylar Velasquez MD    LORazepam 1 mg Intravenous Q4H PRN Gris Mcnally MD    pantoprazole 40 mg Oral Early Morning Nasreen Escudero MD    potassium phosphate 21 mmol Intravenous Once Magalie Tapia MD Last Rate: 21 mmol (03/28/18 1002)   thiamine 100 mg Oral Daily Nasreen Escudero MD      Continuous Infusions:   PRN Meds:   acetaminophen    labetalol    LORazepam      Vitals: Blood pressure 145/87, pulse 92, temperature 98 7 °F (37 1 °C), temperature source Oral, resp  rate 18, height 5' (1 524 m), weight 74 3 kg (163 lb 12 8 oz), SpO2 97 %  ,Body mass index is 31 99 kg/m²  Physical Exam:   Physical Exam   Constitutional: He appears well-developed and well-nourished  No distress  Patient is disheveled and has not been completing ADLs   HENT:   Head: Normocephalic and atraumatic  Right Ear: External ear normal    Left Ear: External ear normal    Pulmonary/Chest: Effort normal  No respiratory distress  Musculoskeletal: Normal range of motion  He exhibits no edema, tenderness or deformity  Neurological: He has normal strength  Skin: Skin is warm and dry  No rash noted  He is not diaphoretic  No erythema  No pallor  Psychiatric: His speech is normal    Flat affect  Patient is not attending to ADLs   Nursing note and vitals reviewed  Neurologic Exam     Mental Status   Oriented to person  Disoriented to place  (Able to state he is in a hospital)  Disoriented to time  Follows 1 step commands  Attention: decreased  Concentration: normal    Speech: speech is normal   Level of consciousness: alert  Knowledge: poor  Abnormal comprehension  Cranial Nerves   Cranial nerves II through XII intact  Motor Exam   Muscle bulk: normal  Overall muscle tone: normal    Strength   Strength 5/5 throughout  Sensory Exam   Light touch normal      Gait, Coordination, and Reflexes     Gait  Gait: (Gait deferred for safety)    Tremor   Resting tremor: absent      Lab, Imaging and other studies: I have personally reviewed pertinent reports       Recent Results (from the past 24 hour(s))   Ammonia Collection Time: 03/28/18  5:25 AM   Result Value Ref Range    Ammonia 28 11 - 35 umol/L   Phosphorus    Collection Time: 03/28/18  5:25 AM   Result Value Ref Range    Phosphorus 2 4 (L) 2 7 - 4 5 mg/dL   Magnesium    Collection Time: 03/28/18  5:25 AM   Result Value Ref Range    Magnesium 1 7 1 6 - 2 6 mg/dL   Comprehensive metabolic panel    Collection Time: 03/28/18  5:25 AM   Result Value Ref Range    Sodium 136 136 - 145 mmol/L    Potassium 3 2 (L) 3 5 - 5 3 mmol/L    Chloride 100 100 - 108 mmol/L    CO2 25 21 - 32 mmol/L    Anion Gap 11 4 - 13 mmol/L    BUN 10 5 - 25 mg/dL    Creatinine 0 67 0 60 - 1 30 mg/dL    Glucose 96 65 - 140 mg/dL    Calcium 9 0 8 3 - 10 1 mg/dL    AST 61 (H) 5 - 45 U/L    ALT 31 12 - 78 U/L    Alkaline Phosphatase 105 46 - 116 U/L    Total Protein 8 4 (H) 6 4 - 8 2 g/dL    Albumin 3 7 3 5 - 5 0 g/dL    Total Bilirubin 1 20 (H) 0 20 - 1 00 mg/dL    eGFR 119 ml/min/1 73sq m   CBC and differential    Collection Time: 03/28/18  5:25 AM   Result Value Ref Range    WBC 7 99 4 31 - 10 16 Thousand/uL    RBC 3 62 (L) 3 88 - 5 62 Million/uL    Hemoglobin 10 2 (L) 12 0 - 17 0 g/dL    Hematocrit 31 9 (L) 36 5 - 49 3 %    MCV 88 82 - 98 fL    MCH 28 2 26 8 - 34 3 pg    MCHC 32 0 31 4 - 37 4 g/dL    RDW 15 6 (H) 11 6 - 15 1 %    MPV 10 4 8 9 - 12 7 fL    Platelets 73 (L) 919 - 390 Thousands/uL    nRBC 0 /100 WBCs    Neutrophils Relative 64 43 - 75 %    Lymphocytes Relative 25 14 - 44 %    Monocytes Relative 8 4 - 12 %    Eosinophils Relative 3 0 - 6 %    Basophils Relative 0 0 - 1 %    Neutrophils Absolute 5 14 1 85 - 7 62 Thousands/µL    Lymphocytes Absolute 1 97 0 60 - 4 47 Thousands/µL    Monocytes Absolute 0 62 0 17 - 1 22 Thousand/µL    Eosinophils Absolute 0 21 0 00 - 0 61 Thousand/µL    Basophils Absolute 0 03 0 00 - 0 10 Thousands/µL   Protime-INR    Collection Time: 03/28/18  5:25 AM   Result Value Ref Range    Protime 13 4 12 1 - 14 4 seconds    INR 1 02 0 86 - 1 16   ]    VTE Prophylaxis: Sequential compression device (Venodyne)  and Heparin    Counseling / Coordination of Care  Total time spent today 35 minutes  Greater than 50% of total time was spent with the patient and / or family counseling and / or coordination of care  A description of the counseling / coordination of care: The patient was seen examined myself the attending physician  The case was reviewed thoroughly with Internal Medicine as well as case management  Patient was counseled in the room  Medications were adjusted  Kush Kaminski

## 2018-03-28 NOTE — SOCIAL WORK
Cm reviewed patient during care coordination rounds  Patient is not medically stable for d/c at this time  Cm attempted to meet with patient in to review role of Cm  Cm used interpretor [id number] to facilitate communication  Patient oriented to only person  He also could not provide any information about an emergency contact  Pelon did obtain two coupons for seizure medication for Vimpat, and Briviact  These coupons are only approved for 2 weeks of medications  Cm attempting to work with Neurology office for any additional assistance  Cm spoke with Jennifer Frye, who provided some direction on patient assistance programs for seizure medications   Pelon then later met with Neurology PA who stated that the team would be prescribing medication from the Target $4 list

## 2018-03-29 LAB
ANION GAP SERPL CALCULATED.3IONS-SCNC: 9 MMOL/L (ref 4–13)
BUN SERPL-MCNC: 10 MG/DL (ref 5–25)
CALCIUM SERPL-MCNC: 9 MG/DL (ref 8.3–10.1)
CHLORIDE SERPL-SCNC: 102 MMOL/L (ref 100–108)
CO2 SERPL-SCNC: 24 MMOL/L (ref 21–32)
CREAT SERPL-MCNC: 0.65 MG/DL (ref 0.6–1.3)
ERYTHROCYTE [DISTWIDTH] IN BLOOD BY AUTOMATED COUNT: 16.1 % (ref 11.6–15.1)
GFR SERPL CREATININE-BSD FRML MDRD: 120 ML/MIN/1.73SQ M
GLUCOSE SERPL-MCNC: 101 MG/DL (ref 65–140)
HCT VFR BLD AUTO: 29.3 % (ref 36.5–49.3)
HGB BLD-MCNC: 9.6 G/DL (ref 12–17)
MCH RBC QN AUTO: 28.2 PG (ref 26.8–34.3)
MCHC RBC AUTO-ENTMCNC: 32.8 G/DL (ref 31.4–37.4)
MCV RBC AUTO: 86 FL (ref 82–98)
PHOSPHATE SERPL-MCNC: 3 MG/DL (ref 2.7–4.5)
PLATELET # BLD AUTO: 93 THOUSANDS/UL (ref 149–390)
PMV BLD AUTO: 10.2 FL (ref 8.9–12.7)
POTASSIUM SERPL-SCNC: 3.1 MMOL/L (ref 3.5–5.3)
RBC # BLD AUTO: 3.4 MILLION/UL (ref 3.88–5.62)
SODIUM SERPL-SCNC: 135 MMOL/L (ref 136–145)
WBC # BLD AUTO: 4.79 THOUSAND/UL (ref 4.31–10.16)

## 2018-03-29 PROCEDURE — 80048 BASIC METABOLIC PNL TOTAL CA: CPT | Performed by: INTERNAL MEDICINE

## 2018-03-29 PROCEDURE — 84100 ASSAY OF PHOSPHORUS: CPT | Performed by: STUDENT IN AN ORGANIZED HEALTH CARE EDUCATION/TRAINING PROGRAM

## 2018-03-29 PROCEDURE — 85027 COMPLETE CBC AUTOMATED: CPT | Performed by: INTERNAL MEDICINE

## 2018-03-29 RX ORDER — LEVETIRACETAM 500 MG/1
1000 TABLET ORAL EVERY 12 HOURS SCHEDULED
Status: DISCONTINUED | OUTPATIENT
Start: 2018-03-29 | End: 2018-04-04

## 2018-03-29 RX ORDER — POTASSIUM CHLORIDE 20 MEQ/1
40 TABLET, EXTENDED RELEASE ORAL ONCE
Status: COMPLETED | OUTPATIENT
Start: 2018-03-29 | End: 2018-03-29

## 2018-03-29 RX ADMIN — AMLODIPINE BESYLATE 5 MG: 5 TABLET ORAL at 09:18

## 2018-03-29 RX ADMIN — LEVETIRACETAM 1000 MG: 500 TABLET ORAL at 20:04

## 2018-03-29 RX ADMIN — LORAZEPAM 1 MG: 2 INJECTION INTRAMUSCULAR; INTRAVENOUS at 09:59

## 2018-03-29 RX ADMIN — LORAZEPAM 1 MG: 2 INJECTION INTRAMUSCULAR; INTRAVENOUS at 20:04

## 2018-03-29 RX ADMIN — LORAZEPAM 1 MG: 2 INJECTION INTRAMUSCULAR; INTRAVENOUS at 04:05

## 2018-03-29 RX ADMIN — PANTOPRAZOLE SODIUM 40 MG: 40 TABLET, DELAYED RELEASE ORAL at 05:40

## 2018-03-29 RX ADMIN — Medication 100 MG: at 09:18

## 2018-03-29 RX ADMIN — HEPARIN SODIUM 5000 UNITS: 5000 INJECTION, SOLUTION INTRAVENOUS; SUBCUTANEOUS at 20:04

## 2018-03-29 RX ADMIN — Medication 400 MCG: at 09:18

## 2018-03-29 RX ADMIN — POTASSIUM CHLORIDE 40 MEQ: 1500 TABLET, EXTENDED RELEASE ORAL at 16:28

## 2018-03-29 RX ADMIN — LEVETIRACETAM 1000 MG: 500 TABLET ORAL at 10:15

## 2018-03-29 RX ADMIN — HEPARIN SODIUM 5000 UNITS: 5000 INJECTION, SOLUTION INTRAVENOUS; SUBCUTANEOUS at 13:50

## 2018-03-29 NOTE — PROGRESS NOTES
IM Residency Progress Note   Unit/Bed#: [facility] 920-01 Encounter: [id number]  SOD Team B       Jony Ahr 43 y o  male [id number]    Hospital Stay Days: 3      Assessment/Plan:    Principal Problem:    Acute encephalopathy  Active Problems:    Hypertension    Encephalomalacia    Head trauma    Status post craniectomy    Necrotic eschar    Hypokalemia    Hypophosphatemia    Seizure (HCC)    Acute encephalopathy 2/2 likely Seizure Activity secondary to encephalomalacia vs less likely alcohol withdrawal or intoxication  - Ast, ALT ratio downtrending  Likely AST = ck related  Aox1-2  CIWA ~2   - 1 9% MELD SCORE  7 2 points Maddrey's Discriminant Function for Alcoholic Hepatitis  No need for steroids at this moment as <32   - Improved  Does have a history of 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   Encephalomalacia seen on CT scan on 03/26   - Keppra 1 g po b i d   For now  - Continue thiamine and folate  - Ativan 1 mg q 6 hours p r n  for seizures and anxiety  - Continue with regular diet  - Neurology consult for optimization of AEDs and outpatient follow-up  - Ammonia WNL level, INR = 1 02, AST 61, ALT 31  AST from muscle likely  C/w trending   - Hold off on Pt/OT  Patient able to ambulate      HTN - 's-140s/70-80's  - BP stable around 140/90   - Cont amlodipine 5 mg p o  Daily  Will likely require uptitration of antihypertensives  - Labetalol 10 mg q 6 hours p r n  for systolic blood pressure greater than 160     Elevated LFT's on admission - downtrending  Likely ast elevated from muscle breakdown  LFT down trended to AST 61, ALT 31  Total bili 1 2  Likely due to increased CK (due to immobility)  Unclear history of acute alcohol use  Possible long term use  - Counseled on alcohol cessation  - Ammonia WNL, INR WNL      Hypophosphotemia/Hypokalemia  - phos and k both low yesterday  Repleated with 21mmol of kphos iv  - k = 3 2 today  Will replete again with kdur  -Recheck k and phos tomorrow  Anemia/Thrombocytopenia  Hemoglobin 9 6 and platelets 93 currently  Continue to follow  Unknown baseline   Could be in the setting of liver disease (alcoholic cirrhosis?)   No signs of active bleeding at the moment   - RUQ shows hepatomegaly and hepatic steatosis with incidental horshoe kidney  Patient should follow up outpatient   - Transfuse for hemoglobin less than 7 or symptomatic anemia  - Transfuse platelets less than 95,900     Fever   - Afebrile for 48 hours and normal white count     Necrotic eschar on left forarm - unclear etiology - likely trauma/burn  Pt notes it happened from trauma 3 weeks ago  differential for nectoric non tender eschar: trauma/burn (patient gives history of trauma 3 weeks ago) vs less likely Infection vs less likely arterial (less likely, as has good pulses, hepc?, Antiphospholipid syndrome?)  Infection differential includes (tick bite), although there is only one bite and not multiple, spider bite, unlikely deep fungal, unlikely calciphylaxis, unlikely anthrax  - TX as per wound care  May benefit from collagenase  Likely can hold off on debridement for now  Will hold off on plastic surgery consult for now  - monitor vs picture in chart from ED on admission  Disposition: Patient continues to require inpatient currently  Mental status improving and unsure of baseline  Subjective:   Patient seen and examined  Used blue phone to communicate with patient in [place]  He states that he lives in [place]  He states that he would like to go home and will take a bus if needed  He states that he lives with friends in [place]  His family lives in [facility]   He cannot provide me any contact information of friends or family  Otherwise offers no complaints         Vitals: Temp (24hrs), Av 3 °F (37 4 °C), Min:98 7 °F (37 1 °C), Max:99 8 °F (37 7 °C)  Current: Temperature: 99 3 °F (37 4 °C)  Vitals:    18 0720 18 1534 18 0007 03/29/18 0719   BP: 145/87 170/95 143/90 139/88   BP Location: Right arm Right arm Right arm Right arm   Pulse: 92 101 93 85   Resp: 18 18 18 18   Temp: 98 7 °F (37 1 °C) 98 7 °F (37 1 °C) 99 8 °F (37 7 °C) 99 3 °F (37 4 °C)   TempSrc: Oral Oral Oral Oral   SpO2: 97% 97% 96% 95%   Weight:       Height:        Body mass index is 31 99 kg/m²  I/O last 24 hours: In: 1320 [P O :1320]  Out: -       Physical Exam: General appearance: alert and oriented to person only  appears distracted  Head: Normocephalic, without obvious abnormality, atraumatic  Eyes: conjunctivae/corneas clear  PERRL, EOM's intact  Fundi benign    Neck: no JVD and supple, symmetrical, trachea midline  Lungs: clear to auscultation bilaterally  Heart: regular rate and rhythm, S1, S2 normal, no murmur, click, rub or gallop  Abdomen: soft, non-tender; bowel sounds normal; no masses,  no organomegaly  Extremities: extremities normal, warm and well-perfused; no cyanosis, clubbing, or edema  Skin: large black eschar in upper extremity  Neurologic: Grossly normal     Invasive Devices     Peripheral Intravenous Line            Peripheral IV 03/28/18 Right Arm less than 1 day                          Labs:   Recent Results (from the past 24 hour(s))   Phosphorus    Collection Time: 03/29/18  5:14 AM   Result Value Ref Range    Phosphorus 3 0 2 7 - 4 5 mg/dL   CBC    Collection Time: 03/29/18  5:14 AM   Result Value Ref Range    WBC 4 79 4 31 - 10 16 Thousand/uL    RBC 3 40 (L) 3 88 - 5 62 Million/uL    Hemoglobin 9 6 (L) 12 0 - 17 0 g/dL    Hematocrit 29 3 (L) 36 5 - 49 3 %    MCV 86 82 - 98 fL    MCH 28 2 26 8 - 34 3 pg    MCHC 32 8 31 4 - 37 4 g/dL    RDW 16 1 (H) 11 6 - 15 1 %    Platelets 93 (L) 806 - 390 Thousands/uL    MPV 10 2 8 9 - 12 7 fL   Basic metabolic panel    Collection Time: 03/29/18  5:14 AM   Result Value Ref Range    Sodium 135 (L) 136 - 145 mmol/L    Potassium 3 1 (L) 3 5 - 5 3 mmol/L    Chloride 102 100 - 108 mmol/L    CO2 24 21 - 32 mmol/L    Anion Gap 9 4 - 13 mmol/L    BUN 10 5 - 25 mg/dL    Creatinine 0 65 0 60 - 1 30 mg/dL    Glucose 101 65 - 140 mg/dL    Calcium 9 0 8 3 - 10 1 mg/dL    eGFR 120 ml/min/1 73sq m       Radiology Results: I have personally reviewed pertinent reports  Other Diagnostic Testing:   I have personally reviewed pertinent reports          Active Meds:   Current Facility-Administered Medications   Medication Dose Route Frequency    acetaminophen (TYLENOL) tablet 650 mg  650 mg Oral Q12H PRN    amLODIPine (NORVASC) tablet 5 mg  5 mg Oral Daily    folic acid (FOLVITE) tablet 400 mcg  400 mcg Oral Daily    heparin (porcine) subcutaneous injection 5,000 Units  5,000 Units Subcutaneous Q8H [address]    labetalol (NORMODYNE) injection 10 mg  10 mg Intravenous Q6H PRN    levETIRAcetam (KEPPRA) tablet 1,000 mg  1,000 mg Oral Q12H ABELINO    LORazepam (ATIVAN) 2 mg/mL injection 1 mg  1 mg Intravenous Q4H PRN    pantoprazole (PROTONIX) EC tablet 40 mg  40 mg Oral Early Morning    thiamine (VITAMIN B1) tablet 100 mg  100 mg Oral Daily         VTE Pharmacologic Prophylaxis: Heparin  VTE Mechanical Prophylaxis: sequential compression device    Jess Peterson MD

## 2018-03-30 LAB
ANION GAP SERPL CALCULATED.3IONS-SCNC: 9 MMOL/L (ref 4–13)
BUN SERPL-MCNC: 16 MG/DL (ref 5–25)
CALCIUM SERPL-MCNC: 9 MG/DL (ref 8.3–10.1)
CHLORIDE SERPL-SCNC: 104 MMOL/L (ref 100–108)
CO2 SERPL-SCNC: 22 MMOL/L (ref 21–32)
CREAT SERPL-MCNC: 0.85 MG/DL (ref 0.6–1.3)
ERYTHROCYTE [DISTWIDTH] IN BLOOD BY AUTOMATED COUNT: 16.4 % (ref 11.6–15.1)
GFR SERPL CREATININE-BSD FRML MDRD: 107 ML/MIN/1.73SQ M
GLUCOSE SERPL-MCNC: 103 MG/DL (ref 65–140)
HCT VFR BLD AUTO: 31.5 % (ref 36.5–49.3)
HGB BLD-MCNC: 10.2 G/DL (ref 12–17)
MCH RBC QN AUTO: 27.8 PG (ref 26.8–34.3)
MCHC RBC AUTO-ENTMCNC: 32.4 G/DL (ref 31.4–37.4)
MCV RBC AUTO: 86 FL (ref 82–98)
PLATELET # BLD AUTO: 136 THOUSANDS/UL (ref 149–390)
PMV BLD AUTO: 10.2 FL (ref 8.9–12.7)
POTASSIUM SERPL-SCNC: 4.5 MMOL/L (ref 3.5–5.3)
RBC # BLD AUTO: 3.67 MILLION/UL (ref 3.88–5.62)
SODIUM SERPL-SCNC: 135 MMOL/L (ref 136–145)
WBC # BLD AUTO: 4.84 THOUSAND/UL (ref 4.31–10.16)

## 2018-03-30 PROCEDURE — 85027 COMPLETE CBC AUTOMATED: CPT | Performed by: INTERNAL MEDICINE

## 2018-03-30 PROCEDURE — 99233 SBSQ HOSP IP/OBS HIGH 50: CPT | Performed by: PSYCHIATRY & NEUROLOGY

## 2018-03-30 PROCEDURE — 80048 BASIC METABOLIC PNL TOTAL CA: CPT | Performed by: INTERNAL MEDICINE

## 2018-03-30 RX ADMIN — HEPARIN SODIUM 5000 UNITS: 5000 INJECTION, SOLUTION INTRAVENOUS; SUBCUTANEOUS at 22:12

## 2018-03-30 RX ADMIN — HEPARIN SODIUM 5000 UNITS: 5000 INJECTION, SOLUTION INTRAVENOUS; SUBCUTANEOUS at 13:56

## 2018-03-30 RX ADMIN — Medication 400 MCG: at 10:08

## 2018-03-30 RX ADMIN — Medication 100 MG: at 10:08

## 2018-03-30 RX ADMIN — AMLODIPINE BESYLATE 5 MG: 5 TABLET ORAL at 10:08

## 2018-03-30 RX ADMIN — HEPARIN SODIUM 5000 UNITS: 5000 INJECTION, SOLUTION INTRAVENOUS; SUBCUTANEOUS at 05:42

## 2018-03-30 RX ADMIN — LEVETIRACETAM 1000 MG: 500 TABLET ORAL at 22:12

## 2018-03-30 RX ADMIN — PANTOPRAZOLE SODIUM 40 MG: 40 TABLET, DELAYED RELEASE ORAL at 05:42

## 2018-03-30 RX ADMIN — LORAZEPAM 1 MG: 2 INJECTION INTRAMUSCULAR; INTRAVENOUS at 13:56

## 2018-03-30 RX ADMIN — LEVETIRACETAM 1000 MG: 500 TABLET ORAL at 10:08

## 2018-03-30 NOTE — PROGRESS NOTES
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 and continued alcohol use in setting of likely depression and 3rd grade education only  It is reasonable to assess for reversible causes, however it is unlikely that patient will significantly improve from current mental status    -Ammonia WNL  B12, TSH, Folate pending    -neuro cognitive evaluation has been completed, please see Dr Rosanna Farah completed assessment     -no further workup indicated at this time  If the patient experiences sudden onset change in mental status or change in neuro exam, recommend stat head Ct  2)  Seizure disroder, likely related to chronic R hemispheric encephalomalacia s/p craniectomy, unclear etiology, or chronic bihemispheric encephalomalacia s/p trauma    -Routine EEG pending   -Continue Keppra at current dosage    -Discussed with SOD and case management; Attempting to arrange appropriate after care  3)  History of extensive traumatic brain injury   -case management and SOD team assisting patient in aftercare arrangements    Subjective:   No acute events overnight  A neuro cognitive assessment was completed today, patient will be deemed to not have capacity  The patient was discussed extensively with SOD, case management, and Dr Ji Waggoner, who performed the neuro cognitive assessment  Patient is likely at his baseline  A 12 point ROS was completed and was negative  Unchanged neuro exam as detailed below       ROS:  See Subjective Medications:  Scheduled Meds:    Current Facility-Administered Medications:  acetaminophen 650 mg Oral Q12H PRN Quyen Sierra MD   amLODIPine 5 mg Oral Daily Radha Delacruz MD   folic acid 419 mcg Oral Daily Radha Delacruz MD   heparin (porcine) 5,000 Units Subcutaneous [facility] Joy Vazquez MD   labetalol 10 mg Intravenous Q6H PRN Radha Delacruz MD   levETIRAcetam 1,000 mg Oral Q12H [facility] Nadia Laura MD   LORazepam 1 mg Intravenous Q4H PRN Quyen Sierra MD   pantoprazole 40 mg Oral Early Morning Radha Delacruz MD   thiamine 100 mg Oral Daily Radha Delacruz MD     Continuous Infusions:   PRN Meds:   acetaminophen    labetalol    LORazepam      Vitals: Blood pressure 142/86, pulse 88, temperature 99 °F (37 2 °C), temperature source Oral, resp  rate 18, height 5' (1 524 m), weight 74 3 kg (163 lb 12 8 oz), SpO2 95 %  ,Body mass index is 31 99 kg/m²  Physical Exam:   Physical Exam   Constitutional: He appears well-developed and well-nourished  No distress  Patient is disheveled and has not been completing ADLs   HENT:   Head: Normocephalic and atraumatic  Right Ear: External ear normal    Left Ear: External ear normal    Pulmonary/Chest: Effort normal  No respiratory distress  Musculoskeletal: Normal range of motion  He exhibits no edema, tenderness or deformity  Neurological: He has normal strength  Skin: Skin is warm and dry  No rash noted  He is not diaphoretic  No erythema  No pallor  Psychiatric: His speech is normal    Flat affect  Nursing note and vitals reviewed  Neurologic Exam     Mental Status   Oriented to person  Disoriented to place  (Able to state he is in a hospital)  Disoriented to time  Follows 1 step commands  Attention: decreased  Concentration: normal    Speech: speech is normal   Level of consciousness: alert  Knowledge: poor  Abnormal comprehension  Cranial Nerves   Cranial nerves II through XII intact       Motor Exam   Muscle bulk: normal  Overall muscle tone: normal    Strength   Strength 5/5 throughout  Sensory Exam   Light touch normal      Gait, Coordination, and Reflexes     Gait  Gait: (Gait deferred for safety)    Tremor   Resting tremor: absent      Lab, Imaging and other studies: I have personally reviewed pertinent reports  Recent Results (from the past 24 hour(s))   Basic metabolic panel    Collection Time: 03/30/18  5:30 AM   Result Value Ref Range    Sodium 135 (L) 136 - 145 mmol/L    Potassium 4 5 3 5 - 5 3 mmol/L    Chloride 104 100 - 108 mmol/L    CO2 22 21 - 32 mmol/L    Anion Gap 9 4 - 13 mmol/L    BUN 16 5 - 25 mg/dL    Creatinine 0 85 0 60 - 1 30 mg/dL    Glucose 103 65 - 140 mg/dL    Calcium 9 0 8 3 - 10 1 mg/dL    eGFR 107 ml/min/1 73sq m   CBC    Collection Time: 03/30/18  5:30 AM   Result Value Ref Range    WBC 4 84 4 31 - 10 16 Thousand/uL    RBC 3 67 (L) 3 88 - 5 62 Million/uL    Hemoglobin 10 2 (L) 12 0 - 17 0 g/dL    Hematocrit 31 5 (L) 36 5 - 49 3 %    MCV 86 82 - 98 fL    MCH 27 8 26 8 - 34 3 pg    MCHC 32 4 31 4 - 37 4 g/dL    RDW 16 4 (H) 11 6 - 15 1 %    Platelets 563 (L) 618 - 390 Thousands/uL    MPV 10 2 8 9 - 12 7 fL   ]    VTE Prophylaxis: Sequential compression device (Venodyne)  and Heparin    Counseling / Coordination of Care  Total time spent today 45 minutes  Greater than 50% of total time was spent with the patient and / or family counseling and / or coordination of care  A description of the counseling / coordination of care: The patient was seen examined myself the attending physician  The case was reviewed thoroughly with Internal Medicine as well as case management  Patient was counseled in the room  Medications were adjusted

## 2018-03-30 NOTE — CASE MANAGEMENT
Continued Stay Review    Date: 3/30    Vital Signs: /86 (BP Location: Right arm)   Pulse 88   Temp 99 °F (37 2 °C) (Oral)   Resp 18   Ht 5' (1 524 m)   Wt 74 3 kg (163 lb 12 8 oz)   SpO2 95%   BMI 31 99 kg/m²     Medications:   Scheduled Meds:   Current Facility-Administered Medications:  amLODIPine 5 mg Oral Daily   folic acid 524 mcg Oral Daily   heparin (porcine) 5,000 Units Subcutaneous Q8H ABELINO   levETIRAcetam 1,000 mg Oral Q12H ABELINO   pantoprazole 40 mg Oral Early Morning   thiamine 100 mg Oral Daily     Continuous Infusions:    PRN Meds:   acetaminophen    labetalol    LORazepam    Abnormal Labs/Diagnostic Results:    03/30/18 0530    Sodium 136 - 145 mmol/L 135       Age/Sex: 43 y o  male     Assessment/Plan:   Principal Problem:    Acute encephalopathy  Active Problems:    Hypertension    Encephalomalacia    Head trauma    Status post craniectomy    Necrotic eschar    Hypokalemia    Hypophosphatemia    Seizure (HCC)     Acute encephalopathy 2/2 likely Seizure Activity secondary to encephalomalacia vs less likely alcohol withdrawal or intoxication  - Ast, ALT ratio downtrending  CIWA 0  Will check CMP tomorrow  - Improved  Does have a history of 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   Encephalomalacia seen on CT scan on 03/26   - Keppra 1 g po b i d   For now  - Continue thiamine and folate  - Ativan 1 mg q 6 hours p r n  for seizures and anxiety  - Continue with regular diet  - Neurology consult for optimization of AEDs and outpatient follow-up  - Hold off on Pt/OT  Patient able to ambulate   -Patient to get competency evaluation today     HTN  - BP stable around 140/90   - Cont amlodipine 5 mg p o  Daily  Will likely require uptitration of antihypertensives outpatient setting   - Labetalol 10 mg q 6 hours p r n  for systolic blood pressure greater than 160     Elevated LFT's on admission - downtrending  Likely ast elevated from muscle breakdown    LFT downtrending  Will check CMP tomorrow  Likely due to increased CK (due to immobility)  Unclear history of acute alcohol use  Possible long term use  - Counseled on alcohol cessation  - Ammonia WNL, INR WNL      Hypophosphotemia/Hypokalemia  - phos and k both low yesterday  Repleated with 21mmol of kphos iv and 40meq of kdur   -K this morning normal  Will recheck electrolytes tomorrow      Anemia/Thrombocytopenia  Hemoglobin 10 2 and platelets 854 currently  Both improving since admission  Continue to follow  Unknown baseline   Could be in the setting of liver disease (alcoholic cirrhosis?)   No signs of active bleeding at the moment   - RUQ shows hepatomegaly and hepatic steatosis with incidental horshoe kidney  Patient should follow up outpatient      Fever   - Afebrile for 48 hours and normal white count     Necrotic eschar on left forarm - unclear etiology - likely trauma/burn  Pt notes it happened from trauma 3 weeks ago  differential for nectoric non tender eschar: trauma/burn (patient gives history of trauma 3 weeks ago) vs less likely Infection vs less likely arterial (less likely, as has good pulses, hepc?, Antiphospholipid syndrome?)  Infection differential includes (tick bite), although there is only one bite and not multiple, spider bite, unlikely deep fungal, unlikely calciphylaxis, unlikely anthrax       - TX as per wound care  May benefit from collagenase  Likely can hold off on debridement for now  Will hold off on plastic surgery consult for now    - monitor vs picture in chart from ED on admission      Disposition: Continue inpatient care        Discharge Plan: Home when medically cleared

## 2018-03-30 NOTE — CONSULTS
NEUROPSYCHOLOGICAL EVALUATION OF COGNITIVE CAPACITY        Name: Marta Hernandez  : 75   Date of Service: 3/30/18   [address] [facility]   Educ:  3rd Grade               Examiner:  Dr Roman Okeefe         Presenting Problem:     Patient is being referred for a Neuropsychological Evaluation of his current Cognitive Capacity  Past Medical History:     Patient's history is positive for HTN, Acute Encephalopathy, Encephalomalacia, Head Trauma, s/p Craniectomy, Necrotic Eschar, Acute Respiratory  Failure with Hypoxia, Alcohol Abuse, Falls, Depression  Patient is receiving Amlodipine, Folic Acid, Levetiracetam, Pantoprazole, Potassium Chloride, Thiamine  PRN Medications include Acetaminophen,  Labetalol, Lorazepam   Patient has NDKA  Past Psychiatric History:     Is positive for Depression resulting in a 72 hour psychiatric stay in [place], [place] dates unspecified  Behavioral Observations:     Patient was alert, but was not oriented in any area  He demonstrated no abnormalities of thought, but had dysarthric speech making it periodically difficult to understand him  He was relatively quiet, but appropriate, with flat affect, and dysphoric mood  He acknowledges feeling depressed due to a lack of contact with his family in [facility] for the past three months  He denies Death or Suicidal Ideation, Plan, or Intent, and does NOT appear to be at imminent risk  Patient minimizes his alcohol use, and denies any hallucinations, delusions, or other misperceptions of reality  He was capable of responding to questions  He has a limited education (third grade) and results should be interpreted with this in mind  The patient was solely English-speaking and was evaluated in that language  Findings:     Overall the patient demonstrated marked impairments in most, if not all areas of the assessment    He demonstrated the following levels of cognitive functioning:      General Cognitive Functioning was impaired (MOCA - 8/30)    Attention/Concentration - impairments in all areas with a slightly better performance in Processing Speed  Executive Functioning - impairments in all areas with a slightly better performance in Cognitive Control  Language Functioning - impairments in all areas  Memory - impairments in Short Term and Long Term Auditory Narrative & Verbal List Learning  Visual Recognition - impaired  Functional Areas - Health & Safety and Financial Management are impaired, with patient capable of performing only simplistic mathematical computations  Mood - he presents with Mild Depression at this time receiving a 7/15 on the [place] Geriatric Depression Scale - Short Form  He denies anxiety, but acknowledges worrying about his family in [facility]     Conclusions:     As can be seen the patient presents with a uniformly poor overall functioning level exacerbated by both his current feelings of depression, and a history of alcohol abuse, and head trauma  Given these factors he does NOT appear to retain the capacity to make fully-informed decisions in Medical/Self-Care and Financial Management  Diagnostic Impression:    (1) F 02 80 Dementia in other diseases without behavioral disturbance      (2) F 43 21 Adjustment Disorder with Depressed Mood    (3) F 10 99 Alcohol Use, Unspecified        James Camarena, Ph D   Psychologist

## 2018-03-30 NOTE — PROGRESS NOTES
IM Residency Progress Note   Unit/Bed#: [facility] 920-01 Encounter: [id number]  SOD Team B       Barnie Simmonds 43 y o  male [id number]    Hospital Stay Days: 4      Assessment/Plan:    Principal Problem:    Acute encephalopathy  Active Problems:    Hypertension    Encephalomalacia    Head trauma    Status post craniectomy    Necrotic eschar    Hypokalemia    Hypophosphatemia    Seizure (HCC)    Acute encephalopathy 2/2 likely Seizure Activity secondary to encephalomalacia vs less likely alcohol withdrawal or intoxication  - Ast, ALT ratio downtrending  CIWA 0  Will check CMP tomorrow  - Improved  Does have a history of 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   Encephalomalacia seen on CT scan on 03/26   - Keppra 1 g po b i d   For now  - Continue thiamine and folate  - Ativan 1 mg q 6 hours p r n  for seizures and anxiety  - Continue with regular diet  - Neurology consult for optimization of AEDs and outpatient follow-up  - Hold off on Pt/OT  Patient able to ambulate   -Patient to get competency evaluation today     HTN  - BP stable around 140/90   - Cont amlodipine 5 mg p o  Daily  Will likely require uptitration of antihypertensives outpatient setting   - Labetalol 10 mg q 6 hours p r n  for systolic blood pressure greater than 160     Elevated LFT's on admission - downtrending  Likely ast elevated from muscle breakdown  LFT downtrending  Will check CMP tomorrow  Likely due to increased CK (due to immobility)  Unclear history of acute alcohol use  Possible long term use  - Counseled on alcohol cessation  - Ammonia WNL, INR WNL      Hypophosphotemia/Hypokalemia  - phos and k both low yesterday  Repleated with 21mmol of kphos iv and 40meq of kdur   -K this morning normal  Will recheck electrolytes tomorrow      Anemia/Thrombocytopenia  Hemoglobin 10 2 and platelets 475 currently  Both improving since admission  Continue to follow   Unknown baseline   Could be in the setting of liver disease (alcoholic cirrhosis?)   No signs of active bleeding at the moment   - RUQ shows hepatomegaly and hepatic steatosis with incidental horshoe kidney  Patient should follow up outpatient  Fever   - Afebrile for 48 hours and normal white count     Necrotic eschar on left forarm - unclear etiology - likely trauma/burn  Pt notes it happened from trauma 3 weeks ago  differential for nectoric non tender eschar: trauma/burn (patient gives history of trauma 3 weeks ago) vs less likely Infection vs less likely arterial (less likely, as has good pulses, hepc?, Antiphospholipid syndrome?)  Infection differential includes (tick bite), although there is only one bite and not multiple, spider bite, unlikely deep fungal, unlikely calciphylaxis, unlikely anthrax       - TX as per wound care  May benefit from collagenase  Likely can hold off on debridement for now  Will hold off on plastic surgery consult for now  - monitor vs picture in chart from ED on admission  Disposition: Continue inpatient care       Subjective:   Patient seen and examined  Appears more calm and less fidgety/distracted today  Is pleasant but confused  Vitals: Temp (24hrs), Av 1 °F (37 3 °C), Min:98 8 °F (37 1 °C), Max:99 5 °F (37 5 °C)  Current: Temperature: 99 °F (37 2 °C)  Vitals:    18 0930 18 1543 18 2348 18 0723   BP:  141/81 134/75 142/86   BP Location:  Right arm Right arm Right arm   Pulse:  90 92 88   Resp:   18   Temp:  99 5 °F (37 5 °C) 98 8 °F (37 1 °C) 99 °F (37 2 °C)   TempSrc:  Oral Oral Oral   SpO2: 96% 95% 95%    Weight:       Height:        Body mass index is 31 99 kg/m²  I/O last 24 hours: In: 12 [P O :960]  Out: -       Physical Exam: General appearance: alert and oriented to person only  Head: Normocephalic, without obvious abnormality, atraumatic  Eyes: conjunctivae/corneas clear  PERRL, EOM's intact  Fundi benign    Neck: no JVD and supple, symmetrical, trachea midline  Lungs: clear to auscultation bilaterally  Heart: regular rate and rhythm, S1, S2 normal, no murmur, click, rub or gallop  Abdomen: soft, non-tender; bowel sounds normal; no masses,  no organomegaly  Extremities: extremities normal, warm and well-perfused; no cyanosis, clubbing, or edema  Skin: Skin color, texture, turgor normal  No rashes or lesions  Neurologic: Mental status: oriented to person only  Cranial nerves: normal  Motor: grossly normal     Invasive Devices     Peripheral Intravenous Line            Peripheral IV 03/28/18 Right Arm 1 day                          Labs:   Recent Results (from the past 24 hour(s))   Basic metabolic panel    Collection Time: 03/30/18  5:30 AM   Result Value Ref Range    Sodium 135 (L) 136 - 145 mmol/L    Potassium 4 5 3 5 - 5 3 mmol/L    Chloride 104 100 - 108 mmol/L    CO2 22 21 - 32 mmol/L    Anion Gap 9 4 - 13 mmol/L    BUN 16 5 - 25 mg/dL    Creatinine 0 85 0 60 - 1 30 mg/dL    Glucose 103 65 - 140 mg/dL    Calcium 9 0 8 3 - 10 1 mg/dL    eGFR 107 ml/min/1 73sq m   CBC    Collection Time: 03/30/18  5:30 AM   Result Value Ref Range    WBC 4 84 4 31 - 10 16 Thousand/uL    RBC 3 67 (L) 3 88 - 5 62 Million/uL    Hemoglobin 10 2 (L) 12 0 - 17 0 g/dL    Hematocrit 31 5 (L) 36 5 - 49 3 %    MCV 86 82 - 98 fL    MCH 27 8 26 8 - 34 3 pg    MCHC 32 4 31 4 - 37 4 g/dL    RDW 16 4 (H) 11 6 - 15 1 %    Platelets 296 (L) 478 - 390 Thousands/uL    MPV 10 2 8 9 - 12 7 fL       Radiology Results: I have personally reviewed pertinent reports  Other Diagnostic Testing:   I have personally reviewed pertinent reports          Active Meds:   Current Facility-Administered Medications   Medication Dose Route Frequency    acetaminophen (TYLENOL) tablet 650 mg  650 mg Oral Q12H PRN    amLODIPine (NORVASC) tablet 5 mg  5 mg Oral Daily    folic acid (FOLVITE) tablet 400 mcg  400 mcg Oral Daily    heparin (porcine) subcutaneous injection 5,000 Units  5,000 Units Subcutaneous [facility]  labetalol (NORMODYNE) injection 10 mg  10 mg Intravenous Q6H PRN    levETIRAcetam (KEPPRA) tablet 1,000 mg  1,000 mg Oral Q12H ABELINO    LORazepam (ATIVAN) 2 mg/mL injection 1 mg  1 mg Intravenous Q4H PRN    pantoprazole (PROTONIX) EC tablet 40 mg  40 mg Oral Early Morning    thiamine (VITAMIN B1) tablet 100 mg  100 mg Oral Daily         VTE Pharmacologic Prophylaxis: Heparin  VTE Mechanical Prophylaxis: sequential compression device    Dawson Gonzáles MD

## 2018-03-30 NOTE — SOCIAL WORK
CM contacted [employer] to attempt to identify Pt with #[id number] from [employer] card which was found in Pt's wallet  Staff states when bus manager is available, the bus Pt was on from [employer] can be identified and possibly the location the bus came from  CM will f/u Monday when bus manager is available

## 2018-03-31 LAB
FOLATE SERPL-MCNC: 19.6 NG/ML (ref 3.1–17.5)
TSH SERPL DL<=0.05 MIU/L-ACNC: 2.53 UIU/ML (ref 0.36–3.74)
VIT B12 SERPL-MCNC: 629 PG/ML (ref 100–900)

## 2018-03-31 PROCEDURE — 82746 ASSAY OF FOLIC ACID SERUM: CPT | Performed by: PHYSICIAN ASSISTANT

## 2018-03-31 PROCEDURE — 82607 VITAMIN B-12: CPT | Performed by: PHYSICIAN ASSISTANT

## 2018-03-31 PROCEDURE — 84443 ASSAY THYROID STIM HORMONE: CPT | Performed by: PHYSICIAN ASSISTANT

## 2018-03-31 RX ADMIN — HEPARIN SODIUM 5000 UNITS: 5000 INJECTION, SOLUTION INTRAVENOUS; SUBCUTANEOUS at 22:38

## 2018-03-31 RX ADMIN — LEVETIRACETAM 1000 MG: 500 TABLET ORAL at 22:38

## 2018-03-31 RX ADMIN — HEPARIN SODIUM 5000 UNITS: 5000 INJECTION, SOLUTION INTRAVENOUS; SUBCUTANEOUS at 05:05

## 2018-03-31 RX ADMIN — LEVETIRACETAM 1000 MG: 500 TABLET ORAL at 09:34

## 2018-03-31 RX ADMIN — PANTOPRAZOLE SODIUM 40 MG: 40 TABLET, DELAYED RELEASE ORAL at 05:05

## 2018-03-31 RX ADMIN — AMLODIPINE BESYLATE 5 MG: 5 TABLET ORAL at 09:34

## 2018-03-31 RX ADMIN — HEPARIN SODIUM 5000 UNITS: 5000 INJECTION, SOLUTION INTRAVENOUS; SUBCUTANEOUS at 14:36

## 2018-03-31 RX ADMIN — Medication 400 MCG: at 09:34

## 2018-03-31 RX ADMIN — Medication 100 MG: at 09:34

## 2018-03-31 NOTE — PROGRESS NOTES
IM Residency Progress Note   Unit/Bed#: [facility] 920-01 Encounter: [id number]  SOD Team B       Barnie Simmonds 43 y o  male [id number]    Hospital Stay Days: 5      Assessment/Plan:    Principal Problem:    Acute encephalopathy  Active Problems:    Hypertension    Encephalomalacia    Head trauma    Status post craniectomy    Necrotic eschar    Hypokalemia    Hypophosphatemia    Seizure (HCC)    Acute encephalopathy 2/2 likely Seizure Activity secondary to encephalomalacia vs less likely alcohol withdrawal or intoxication  - Ast, ALT ratio downtrending  CIWA 0  Will check CMP tomorrow  - Improved  Does have a history of 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   Encephalomalacia seen on CT scan on 03/26   - Keppra 1 g po b i d   For now  - Continue thiamine and folate, B12 level 629, folate 19 6, TSH 2 53  - Ativan 1 mg q 6 hours p r n  for seizures and anxiety  - Continue with regular diet  - Neurology consult for optimization of AEDs and outpatient follow-up   -patient will need access to [employer] in the outpatient setting will need case management assistance  - Hold off on Pt/OT  Patient able to ambulate   -Patient patient evaluated by neuropsych, deemed incompetent      Dementia  Patient evaluated by neuropsych, deemed to have dementia, adjustment disorder, alcohol use  Patient does not have capacity to make fully informed decisions  If unable to contact family or next of kin, patient may require legal guardian  Will have follow-up  -will discuss with case management today    HTN  - BP stable around 140/90   - Cont amlodipine 5 mg p o  Daily  Will likely require uptitration of antihypertensives outpatient setting   - Labetalol 10 mg q 6 hours p r n  for systolic blood pressure greater than 160     Elevated LFT's on admission - downtrending  Likely ast elevated from muscle breakdown  LFT downtrending  Will check CMP tomorrow  Likely due to increased CK (due to immobility)  Unclear history of acute alcohol use  Possible long term use  - Counseled on alcohol cessation  - Ammonia WNL, INR WNL      Hypophosphotemia/Hypokalemia  - phos and k both low yesterday  Repleated with 21mmol of kphos iv and 40meq of kdur   -K this morning normal  Will recheck electrolytes tomorrow      Anemia/Thrombocytopenia  Hemoglobin 10 2 and platelets 595 currently  Both improving since admission  Continue to follow  Unknown baseline   Could be in the setting of liver disease (alcoholic cirrhosis?)   No signs of active bleeding at the moment   - RUQ shows hepatomegaly and hepatic steatosis with incidental horshoe kidney  Patient should follow up outpatient      Fever   - Afebrile for 48 hours and normal white count     Necrotic eschar on left forarm - unclear etiology - likely trauma/burn  Pt notes it happened from trauma 3 weeks ago  differential for nectoric non tender eschar: trauma/burn (patient gives history of trauma 3 weeks ago) vs less likely Infection vs less likely arterial (less likely, as has good pulses, hepc?, Antiphospholipid syndrome?)  Infection differential includes (tick bite), although there is only one bite and not multiple, spider bite, unlikely deep fungal, unlikely calciphylaxis, unlikely anthrax       - TX as per wound care  May benefit from collagenase  Likely can hold off on debridement for now  Will hold off on plastic surgery consult for now  - monitor vs picture in chart from ED on admission  Disposition:  Continue patient care      Subjective:   No acute events overnight  Patient denies any pain, fever, chills         Vitals: Temp (24hrs), Av 6 °F (37 °C), Min:98 5 °F (36 9 °C), Max:98 6 °F (37 °C)  Current: Temperature: 98 5 °F (36 9 °C)  Vitals:    18 0723 18 0859 18 1517 18 2331   BP: 142/86  118/79 130/80   BP Location: Right arm  Right arm Right arm   Pulse: 88  97 79   Resp: 18  18 16   Temp: 99 °F (37 2 °C)  98 6 °F (37 °C) 98 5 °F (36 9 °C)   TempSrc: Oral  Oral Oral   SpO2:  96% 97% 97%   Weight:       Height:        Body mass index is 31 99 kg/m²  I/O last 24 hours: In: 740 [P O :740]  Out: -       Physical Exam: /80 (BP Location: Right arm)   Pulse 79   Temp 98 5 °F (36 9 °C) (Oral)   Resp 16   Ht 5' (1 524 m)   Wt 74 3 kg (163 lb 12 8 oz)   SpO2 97%   BMI 31 99 kg/m²   General appearance: alert and oriented, in no acute distress  Lungs: clear to auscultation bilaterally  Chest wall: no tenderness  Heart: regular rate and rhythm, S1, S2 normal, no murmur, click, rub or gallop  Abdomen: soft, non-tender; bowel sounds normal; no masses,  no organomegaly  Extremities: no edema, redness or tenderness in the calves or thighs     Invasive Devices     Peripheral Intravenous Line            Peripheral IV 03/28/18 Right Arm 2 days                          Labs:   Recent Results (from the past 24 hour(s))   Vitamin B12    Collection Time: 03/31/18  5:06 AM   Result Value Ref Range    Vitamin B-12 629 100 - 900 pg/mL   Folate    Collection Time: 03/31/18  5:06 AM   Result Value Ref Range    Folate 19 6 (H) 3 1 - 17 5 ng/mL   TSH, 3rd generation    Collection Time: 03/31/18  5:06 AM   Result Value Ref Range    TSH 3RD GENERATON 2 530 0 358 - 3 740 uIU/mL       Radiology Results: I have personally reviewed pertinent reports  Other Diagnostic Testing:   I have personally reviewed pertinent reports          Active Meds:   Current Facility-Administered Medications   Medication Dose Route Frequency    acetaminophen (TYLENOL) tablet 650 mg  650 mg Oral Q12H PRN    amLODIPine (NORVASC) tablet 5 mg  5 mg Oral Daily    folic acid (FOLVITE) tablet 400 mcg  400 mcg Oral Daily    heparin (porcine) subcutaneous injection 5,000 Units  5,000 Units Subcutaneous Q8H [address]    labetalol (NORMODYNE) injection 10 mg  10 mg Intravenous Q6H PRN    levETIRAcetam (KEPPRA) tablet 1,000 mg  1,000 mg Oral Q12H ABELINO    LORazepam (ATIVAN) 2 mg/mL injection 1 mg  1 mg Intravenous Q4H PRN    pantoprazole (PROTONIX) EC tablet 40 mg  40 mg Oral Early Morning    thiamine (VITAMIN B1) tablet 100 mg  100 mg Oral Daily         VTE Pharmacologic Prophylaxis: Heparin  VTE Mechanical Prophylaxis: sequential compression device    Veronica Ram MD

## 2018-04-01 PROBLEM — E87.6 HYPOKALEMIA: Status: RESOLVED | Noted: 2018-03-28 | Resolved: 2018-04-01

## 2018-04-01 PROCEDURE — G8979 MOBILITY GOAL STATUS: HCPCS

## 2018-04-01 PROCEDURE — 97167 OT EVAL HIGH COMPLEX 60 MIN: CPT

## 2018-04-01 PROCEDURE — G8987 SELF CARE CURRENT STATUS: HCPCS

## 2018-04-01 PROCEDURE — 97163 PT EVAL HIGH COMPLEX 45 MIN: CPT

## 2018-04-01 PROCEDURE — G8989 SELF CARE D/C STATUS: HCPCS

## 2018-04-01 PROCEDURE — G8980 MOBILITY D/C STATUS: HCPCS

## 2018-04-01 PROCEDURE — G8978 MOBILITY CURRENT STATUS: HCPCS

## 2018-04-01 PROCEDURE — G8988 SELF CARE GOAL STATUS: HCPCS

## 2018-04-01 RX ADMIN — HEPARIN SODIUM 5000 UNITS: 5000 INJECTION, SOLUTION INTRAVENOUS; SUBCUTANEOUS at 05:23

## 2018-04-01 RX ADMIN — Medication 400 MCG: at 08:46

## 2018-04-01 RX ADMIN — HEPARIN SODIUM 5000 UNITS: 5000 INJECTION, SOLUTION INTRAVENOUS; SUBCUTANEOUS at 13:35

## 2018-04-01 RX ADMIN — Medication 100 MG: at 08:46

## 2018-04-01 RX ADMIN — LEVETIRACETAM 1000 MG: 500 TABLET ORAL at 21:31

## 2018-04-01 RX ADMIN — LEVETIRACETAM 1000 MG: 500 TABLET ORAL at 08:46

## 2018-04-01 RX ADMIN — HEPARIN SODIUM 5000 UNITS: 5000 INJECTION, SOLUTION INTRAVENOUS; SUBCUTANEOUS at 21:31

## 2018-04-01 RX ADMIN — PANTOPRAZOLE SODIUM 40 MG: 40 TABLET, DELAYED RELEASE ORAL at 05:22

## 2018-04-01 NOTE — OCCUPATIONAL THERAPY NOTE
[address]  Evaluation     Patient Name: Pauline LOVING Date: 4/1/2018  Problem List  Patient Active Problem List   Diagnosis    Acute encephalopathy    Hypertension    Encephalomalacia    Head trauma    Status post craniectomy    Necrotic eschar    Hypophosphatemia    Seizure [facility])     Past Medical History  Past Medical History:   Diagnosis Date    Encephalomalacia     Head trauma     Homelessness     Hypertension     Status post craniectomy      Past Surgical History  Past Surgical History:   Procedure Laterality Date    CRANIECTOMY             04/01/18 1204   Note Type   Note type Eval only   Restrictions/Precautions   Weight Bearing Precautions Per Order No   Other Precautions 1:1;Cognitive  (1:1 present at end of therapy session )   Pain Assessment   Pain Assessment No/denies pain   Pain Score No Pain   Home Living   Type of Home Other (Comment)   Additional Comments Pt unable to provide full history  Pt reports [place] the streets, however per 1:1 pt has reported in the past [place] [facility] [place]   Prior Function   Comments Unclear, anticipate independent w/ ADLS PTA    Lifestyle   Autonomy Pt is primarily Uzbek speaking and presents as confused  Rehab aid present to provide Uzbek translation  Pt unable to provide full history  Pt [place] streets, however per 1:1 pt has reported in the past he [place] [facility] [place]     Psychosocial   Psychosocial (WDL) WDL   ADL   Eating Assistance 5  Supervision/Setup   Grooming Assistance 5  Supervision/Setup   UB Bathing Assistance 5  Supervision/Setup   LB Bathing Assistance 5  Supervision/Setup   UB Dressing Assistance 5  Supervision/Setup   LB Dressing Assistance 5  Supervision/Setup   Toileting Assistance  5  Supervision/Setup   Functional Assistance 5  Supervision/Setup   Transfers   Sit to Stand 5  Supervision   Stand to Sit 5  Supervision   Functional Mobility   Functional Mobility 5 Supervision   Additional items (without AD)   Balance   Static Sitting Good   Dynamic Sitting Fair +   Static Standing Good   Dynamic Standing Fair +   Ambulatory Fair   Activity Tolerance   Activity Tolerance Patient tolerated treatment well   Nurse Made Aware JONY HUGHES Assessment   RUE Assessment WFL   LUE Assessment   LUE Assessment WFL   Hand Function   Gross Motor Coordination Functional   Fine Motor Coordination Functional   Cognition   Overall Cognitive Status Impaired   Arousal/Participation Alert; Responsive; Cooperative   Attention Within functional limits   Orientation Level Oriented to person;Oriented to place; Disoriented to situation;Disoriented to time   Memory Unable to assess   Following Commands Follows one step commands without difficulty   Comments Pt dx'd w/ dementia during this hospital visit  Pt reports name however birthdate and age provided are incorrect  Pt aware he is the hospital but is not oriented to situation  Pt unable to provide history regarding home, pt may be homeless ? Assessment   Limitation Decreased cognition   Assessment Pt is a 42 y/o male seen for OT eval s/p adm to [facility] found unresponsive at [facility] dx'd w/ acute encephalopathy, HTN, Encephalomalacia, necrotic eschar, hypokalemia, Hypophosphatemia, seizure and dementia  Possible EtOH use  Pt initially intubated and sedated  Pt currently extubated  Comorbidities include a h/o right craniectomy with encephalomalacia and TBI  Pt with active OT orders and activity as tolerated orders  Pt had a neuropsych evaluation at [facility] which reports pt does not have capacity to make medical decisions  Pt is primarily Palestinian speaking and presents as confused  Rehab aid present to provide Palestinian translation  Pt unable to provide full history  Pt reports [place] the streets, however per 1:1 pt has reported in the past he [place] [facility]   Pt is currently performing ADLS w/ S, functional transfers w/ S and functional mobility without AD w/ S  Pt scored overall 95/100 on the Barthel Index  Based on the aforementioned OT evaluation, functional performance deficits, and assessments, pt has been identified as a high complexity evaluation  At this time pt is not physically appropriate for inpatient rehabilitation however is not safe for home  Pt does not required acute care OT services  Will sign off  Recommend pt ambulate halls and performs ADLs w/ non-OT staff while in the hospital  D/C OT      Goals   Patient Goals none expressed   Recommendation   OT Discharge Recommendation Other (Comment)   Barthel Index   Feeding 10   Bathing 5   Grooming Score 5   Dressing Score 10   Bladder Score 10   Bowels Score 10   Toilet Use Score 10   Transfers (Bed/Chair) Score 15   Mobility (Level Surface) Score 10   Stairs Score 10   Barthel Index Score 95   Modified Estephanie Scale   Modified Estephanie Scale 3       Johnny Koroma MS, OTR/L

## 2018-04-01 NOTE — PROGRESS NOTES
IM Residency Progress Note   Unit/Bed#: [facility] 920-01 Encounter: [id number]  SOD Team B       Chino Oliver 43 y o  male [id number]    Hospital Stay Days: 6      Assessment/Plan:    Principal Problem:    Acute encephalopathy  Active Problems:    Hypertension    Encephalomalacia    Head trauma    Status post craniectomy    Necrotic eschar    Hypokalemia    Hypophosphatemia    Seizure (HCC)    Acute encephalopathy 2/2 likely Seizure Activity secondary to encephalomalacia  Unlikely 2/2 alcohol withdrawal or intoxication  - Ast, ALT ratio downtrending  CIWA 0    - Improved encephalopathy  Does have a history of 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   Encephalomalacia seen on CT scan on 03/26   - Keppra 1 g po b i d   For now  - Continue thiamine and folate,  -B12 level 629, folate 19 6, TSH 2 53  - Ativan 1 mg q 6 hours p r n  for seizures and anxiety  - Neurology consulted for optimization of AEDs and outpatient follow-up   -patient will need access to [facility] in the outpatient setting will need case management assistance   Pt/OT  Activity as tolerated  -Patient evaluated by neuropsych, deemed incompetent      Dementia  Patient evaluated by neuropsych, deemed to have dementia, adjustment disorder, alcohol use  Patient does not have capacity to make fully informed decisions  If unable to contact family or next of kin, patient may require legal guardian  Will have follow-up  -will discuss with case management      HTN  - BP stable   - Cont amlodipine 5 mg p o  Daily    - Labetalol 10 mg q 6 hours p r n  for SBP >160     Elevated LFT's on admission   Downtrending  Likely ast elevated from muscle breakdown  Will check CMP tomorrow  Likely due to increased CK (due to immobility)  Unclear history of acute alcohol use  Possible long term use  - Counseled on alcohol cessation  - Ammonia WNL, INR WNL        Anemia/Thrombocytopenia  Hemoglobin 10 2 and platelets 266 SNICGBDICE   Both improving since admission  Continue to follow  Unknown baseline   Could be in the setting of liver disease (alcoholic cirrhosis?)   No signs of active bleeding at the moment   - RUQ shows hepatomegaly and hepatic steatosis with incidental horshoe kidney  Patient should follow up outpatient           Necrotic eschar on left forarm - unclear etiology - likely trauma/burn  Pt notes it happened from trauma 3 weeks ago  differential for nectoric non tender eschar: trauma/burn (patient gives history of trauma 3 weeks ago) vs less likely Infection vs less likely arterial (less likely, as has good pulses, hepc?, Antiphospholipid syndrome?)  Infection differential includes (tick bite), although there is only one bite and not multiple, spider bite, unlikely deep fungal, unlikely calciphylaxis, unlikely anthrax     - TX as per wound care  May benefit from collagenase  Likely can hold off on debridement for now  Will hold off on plastic surgery consult for now  - monitor vs picture in chart from ED on admission        Disposition:  Continue inpatient care  Will discuss with case management to see if patient has a decision maker or legal guardian  Subjective:   Patient seen examined  No acute overnight events  Patient has no complaints at this time       Vitals: Temp (24hrs), Av 5 °F (36 9 °C), Min:97 9 °F (36 6 °C), Max:99 1 °F (37 3 °C)  Current: Temperature: 99 1 °F (37 3 °C)  Vitals:    18 2331 18 0844 18 1526 18 2327   BP: 130/80  112/73 130/80   BP Location: Right arm  Right arm Right arm   Pulse: 79  81 73   Resp: 16  18 19   Temp: 98 5 °F (36 9 °C)  97 9 °F (36 6 °C) 99 1 °F (37 3 °C)   TempSrc: Oral  Oral Oral   SpO2: 97% 97% 97% 98%   Weight:       Height:        Body mass index is 31 99 kg/m²  I/O last 24 hours: In: 0 [P O :880]  Out: -       Physical Exam:  Physical Exam   Constitutional: He appears well-developed and well-nourished  No distress     HENT:   Head: Normocephalic and atraumatic  Nose: Nose normal    Mouth/Throat: No oropharyngeal exudate  Eyes: Conjunctivae and EOM are normal  Pupils are equal, round, and reactive to light  No scleral icterus  Neck: Normal range of motion  No JVD present  Cardiovascular: Normal rate, regular rhythm, normal heart sounds and intact distal pulses  Exam reveals no gallop and no friction rub  No murmur heard  Pulmonary/Chest: Effort normal and breath sounds normal  No respiratory distress  He has no wheezes  He has no rales  He exhibits no tenderness  Abdominal: Soft  Bowel sounds are normal  He exhibits no distension  There is no tenderness  There is no guarding  Musculoskeletal: Normal range of motion  He exhibits no edema, tenderness or deformity  Lymphadenopathy:     He has no cervical adenopathy  Neurological: He is alert  He displays normal reflexes  No cranial nerve deficit  Coordination normal    AxO x 1   Skin: Skin is warm and dry  He is not diaphoretic  No erythema  Psychiatric: He has a normal mood and affect  Invasive Devices     Peripheral Intravenous Line            Peripheral IV 03/28/18 Right Arm 3 days                          Labs: No results found for this or any previous visit (from the past 24 hour(s))  Radiology Results: I have personally reviewed pertinent reports  Other Diagnostic Testing:   I have personally reviewed pertinent reports          Active Meds:   Current Facility-Administered Medications   Medication Dose Route Frequency    acetaminophen (TYLENOL) tablet 650 mg  650 mg Oral Q12H PRN    amLODIPine (NORVASC) tablet 5 mg  5 mg Oral Daily    folic acid (FOLVITE) tablet 400 mcg  400 mcg Oral Daily    heparin (porcine) subcutaneous injection 5,000 Units  5,000 Units Subcutaneous Q8H [address]    labetalol (NORMODYNE) injection 10 mg  10 mg Intravenous Q6H PRN    levETIRAcetam (KEPPRA) tablet 1,000 mg  1,000 mg Oral Q12H [address]    LORazepam (ATIVAN) 2 mg/mL injection 1 mg  1 mg Intravenous Q4H PRN    pantoprazole (PROTONIX) EC tablet 40 mg  40 mg Oral Early Morning    thiamine (VITAMIN B1) tablet 100 mg  100 mg Oral Daily         VTE Pharmacologic Prophylaxis: Heparin  VTE Mechanical Prophylaxis: sequential compression device    Brooks Villarreal MD

## 2018-04-01 NOTE — PHYSICAL THERAPY NOTE
Physical Therapy Evaluation     Patient's Name: Marisabel Beckwith    Admitting Diagnosis  Lactic acidosis [E87 2]  Seizure ([facility] [address] ) [R56 9]  Unresponsive [R41 89]  Acute encephalopathy [G93 40]    Problem List  Patient Active Problem List   Diagnosis    Acute encephalopathy    Hypertension    Encephalomalacia    Head trauma    Status post craniectomy    Necrotic eschar    Hypophosphatemia    Seizure [facility])       Past Medical History  Past Medical History:   Diagnosis Date    Encephalomalacia     Head trauma     Homelessness     Hypertension     Status post craniectomy        Past Surgical History  Past Surgical History:   Procedure Laterality Date    CRANIECTOMY          18 1200   Note Type   Note type Eval only   Pain Assessment   Pain Assessment No/denies pain   Pain Score No Pain   Home Living   Additional Comments Pt reports he does not have a home  PCA in room reports the other day he said he lived with a cousin    Prior Function   Level of [place] Independent with ADLs and functional mobility   Comments Pt denies use of DME   Restrictions/Precautions   Weight Bearing Precautions Per Order No  (activity as tolerated)   Other Precautions 1:1;Chair Alarm; Restraints; Fall Risk   General   Family/Caregiver Present No   Cognition   Overall Cognitive Status Impaired   Arousal/Participation Cooperative   Orientation Level Oriented to person;Disoriented to time;Disoriented to situation  (able to report location with use of white board)   Memory Decreased recall of precautions;Decreased recall of recent events;Decreased short term memory;Decreased recall of biographical information;Decreased long term memory  (inaccurate age and  occaisonally reported )   Following Commands Follows one step commands without difficulty   Comments Pt is primarily Welsh speaking  toño Robertab aide present during session to provide translation with patient's permission  Pt with a h/o dementia   Poor historian    ELLEN Assessment   RUE Assessment WFL   LUE Assessment   LUE Assessment WFL   RLE Assessment   RLE Assessment WFL   LLE Assessment   LLE Assessment WFL   Coordination   Movements are Fluid and Coordinated 1   Sensation WFL   Light Touch   RLE Light Touch Grossly intact   LLE Light Touch Grossly intact   Proprioception   RLE Proprioception Grossly intact   LLE Proprioception Grossly Intact   Bed Mobility   Additional Comments OOB in chaie upon arrival    Transfers   Sit to Stand 5  Supervision   Stand to Sit 5  Supervision   Stand pivot 5  Supervision   Ambulation/Elevation   Gait pattern Antalgic;Decreased foot clearance; Short stride; Step to;Excessively slow   Gait Assistance 5  Supervision   Assistive Device None   Distance 60'x2    Stair Management Assistance 5  Supervision   Additional items Verbal cues   Stair Management Technique One rail R;Step to pattern; Foreward   Number of Stairs 3  ( steps )   Balance   Static Sitting Good   Dynamic Sitting Good   Static Standing Fair +   Dynamic Standing Fair +   Ambulatory Fair   Endurance Deficit   Endurance Deficit No   Activity Tolerance   Activity Tolerance Patient tolerated treatment well   Medical Staff Made Aware [id number] Magdiel Apodaca OT   Nurse Made Gene Enriquez RN    Assessment   Prognosis Guarded   Problem List Decreased coordination;Decreased cognition; Impaired judgement;Decreased safety awareness   Assessment Pt is a 43year old male brought in as a [place] Cast[place]eda found unresponsive by EMS at the [facility] with possible seizure activity  Pt with a problem list/PMH which includes dementia, lactic acidosis, seizure, unresponsive, acute encephalopathy, HTN, head TR, s/p craniectomy, necrotic eschar, and hypophosphatemia  PT consulted to assess functional mobility and assist with safe d/c planning  PT eval performed with up as tolerated orders  PTA, pt reports being homeless and denies any use of DME  Pt is a poor historian, occasionally providing incorrect age and    Pt alert and oriented only to self and place with utilization of cues in environment  On eval, pt able to perform all activity with S  No LOB or adverse reactions noted  Pt does not qualify for acute PT or rehab services at this time  However, patient is unsafe to d c "alone"  Please refer to CM for additional d/c needs      Goals   Patient Goals none expressed    Recommendation   Recommendation D/C PT   Additional Comments OOB in chair with 1:1 at bedside    Barthel Index   Feeding 10   Bathing 5   Grooming Score 5   Dressing Score 10   Bladder Score 10   Bowels Score 10   Toilet Use Score 10   Transfers (Bed/Chair) Score 15   Mobility (Level Surface) Score 10   Stairs Score 5   Barthel Index Score 90           Nan Morales, PT

## 2018-04-02 LAB
ANION GAP SERPL CALCULATED.3IONS-SCNC: 9 MMOL/L (ref 4–13)
BASOPHILS # BLD AUTO: 0.05 THOUSANDS/ΜL (ref 0–0.1)
BASOPHILS NFR BLD AUTO: 1 % (ref 0–1)
BUN SERPL-MCNC: 17 MG/DL (ref 5–25)
CALCIUM SERPL-MCNC: 9.2 MG/DL (ref 8.3–10.1)
CHLORIDE SERPL-SCNC: 107 MMOL/L (ref 100–108)
CO2 SERPL-SCNC: 23 MMOL/L (ref 21–32)
CREAT SERPL-MCNC: 0.76 MG/DL (ref 0.6–1.3)
EOSINOPHIL # BLD AUTO: 0.18 THOUSAND/ΜL (ref 0–0.61)
EOSINOPHIL NFR BLD AUTO: 4 % (ref 0–6)
ERYTHROCYTE [DISTWIDTH] IN BLOOD BY AUTOMATED COUNT: 16.5 % (ref 11.6–15.1)
GFR SERPL CREATININE-BSD FRML MDRD: 113 ML/MIN/1.73SQ M
GLUCOSE SERPL-MCNC: 98 MG/DL (ref 65–140)
HCT VFR BLD AUTO: 35 % (ref 36.5–49.3)
HGB BLD-MCNC: 10.9 G/DL (ref 12–17)
LYMPHOCYTES # BLD AUTO: 1.55 THOUSANDS/ΜL (ref 0.6–4.47)
LYMPHOCYTES NFR BLD AUTO: 31 % (ref 14–44)
MCH RBC QN AUTO: 28.2 PG (ref 26.8–34.3)
MCHC RBC AUTO-ENTMCNC: 31.1 G/DL (ref 31.4–37.4)
MCV RBC AUTO: 90 FL (ref 82–98)
MONOCYTES # BLD AUTO: 0.95 THOUSAND/ΜL (ref 0.17–1.22)
MONOCYTES NFR BLD AUTO: 19 % (ref 4–12)
NEUTROPHILS # BLD AUTO: 2.18 THOUSANDS/ΜL (ref 1.85–7.62)
NEUTS SEG NFR BLD AUTO: 45 % (ref 43–75)
NRBC BLD AUTO-RTO: 0 /100 WBCS
PLATELET # BLD AUTO: 241 THOUSANDS/UL (ref 149–390)
PMV BLD AUTO: 9.2 FL (ref 8.9–12.7)
POTASSIUM SERPL-SCNC: 4 MMOL/L (ref 3.5–5.3)
RBC # BLD AUTO: 3.87 MILLION/UL (ref 3.88–5.62)
SODIUM SERPL-SCNC: 139 MMOL/L (ref 136–145)
WBC # BLD AUTO: 4.94 THOUSAND/UL (ref 4.31–10.16)

## 2018-04-02 PROCEDURE — 80048 BASIC METABOLIC PNL TOTAL CA: CPT | Performed by: INTERNAL MEDICINE

## 2018-04-02 PROCEDURE — 85025 COMPLETE CBC W/AUTO DIFF WBC: CPT | Performed by: INTERNAL MEDICINE

## 2018-04-02 RX ADMIN — LEVETIRACETAM 1000 MG: 500 TABLET ORAL at 09:39

## 2018-04-02 RX ADMIN — Medication 400 MCG: at 09:39

## 2018-04-02 RX ADMIN — HEPARIN SODIUM 5000 UNITS: 5000 INJECTION, SOLUTION INTRAVENOUS; SUBCUTANEOUS at 09:38

## 2018-04-02 RX ADMIN — HEPARIN SODIUM 5000 UNITS: 5000 INJECTION, SOLUTION INTRAVENOUS; SUBCUTANEOUS at 13:05

## 2018-04-02 RX ADMIN — AMLODIPINE BESYLATE 5 MG: 5 TABLET ORAL at 09:39

## 2018-04-02 RX ADMIN — PANTOPRAZOLE SODIUM 40 MG: 40 TABLET, DELAYED RELEASE ORAL at 09:39

## 2018-04-02 RX ADMIN — Medication 100 MG: at 09:39

## 2018-04-02 RX ADMIN — HEPARIN SODIUM 5000 UNITS: 5000 INJECTION, SOLUTION INTRAVENOUS; SUBCUTANEOUS at 22:28

## 2018-04-02 RX ADMIN — LEVETIRACETAM 1000 MG: 500 TABLET ORAL at 22:28

## 2018-04-02 NOTE — PROGRESS NOTES
IM Residency Progress Note   Unit/Bed#: [facility] 920-01 Encounter: [id number]  SOD Team B       Cinthia Dhaliwal 43 y o  male [id number]    Hospital Stay Days: 7      Assessment/Plan:    Principal Problem:    Acute encephalopathy  Active Problems:    Hypertension    Encephalomalacia    Head trauma    Status post craniectomy    Necrotic eschar    Hypophosphatemia    Seizure (HCC)    Acute encephalopathy 2/2 likely Seizure Activity secondary to encephalomalacia  Unlikely 2/2 alcohol withdrawal or intoxication  - Ast, ALT ratio downtrending  CIWA 0    - Improved encephalopathy  Does have a history of 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   Encephalomalacia seen on CT scan on 03/26   - Keppra 1 g po b i d   For now  - Continue thiamine and folate,  -B12 level 629, folate 19 6, TSH 2 53  - Ativan 1 mg q 6 hours p r n  for seizures and anxiety  - Neurology consulted for optimization of AEDs and outpatient follow-up   -patient will need access to [facility] in the outpatient setting will need case management assistance   Pt/OT  Activity as tolerated  -Patient evaluated by neuropsych, deemed incompetent      Dementia  Patient evaluated by neuropsych, deemed to have dementia, adjustment disorder, alcohol use     Patient does not have capacity to make fully informed decisions     If unable to contact family or next of kin, patient may require legal guardian  Drema Krabbe have follow-up  -will discuss with case management      HTN  - BP stable   - Cont amlodipine 5 mg p o  Daily    - Labetalol 10 mg q 6 hours p r n  for SBP >160     Elevated LFT's on admission   Downtrending  Likely ast elevated from muscle breakdown  Will check CMP tomorrow  Likely due to increased CK (due to immobility)  Unclear history of acute alcohol use  Possible long term use  - Counseled on alcohol cessation  - Ammonia WNL, INR WNL          Anemia/Thrombocytopenia  Hemoglobin 10 2 and platelets count improved to 240 Both improving since admission  Continue to follow  Unknown baseline   Could be in the setting of liver disease (alcoholic cirrhosis?)   No signs of active bleeding at the moment   - RUQ shows hepatomegaly and hepatic steatosis with incidental horshoe kidney  Patient should follow up outpatient            Necrotic eschar on left forarm - unclear etiology - likely trauma/burn  Pt notes it happened from trauma 3 weeks ago  differential for nectoric non tender eschar: trauma/burn (patient gives history of trauma 3 weeks ago) vs less likely Infection vs less likely arterial (less likely, as has good pulses, hepc?, Antiphospholipid syndrome?)  Infection differential includes (tick bite), although there is only one bite and not multiple, spider bite, unlikely deep fungal, unlikely calciphylaxis, unlikely anthrax     - TX as per wound care  May benefit from collagenase  Likely can hold off on debridement for now  Will hold off on plastic surgery consult for now  - monitor vs picture in chart from ED on admission    Disposition:  Continue inpatient care      Subjective:   Patient seen examined  No acute overnight events  No acute complaints at this time  Vitals: Temp (24hrs), Av 3 °F (36 8 °C), Min:97 8 °F (36 6 °C), Max:98 7 °F (37 1 °C)  Current: Temperature: 98 3 °F (36 8 °C)  Vitals:    18 0714 18 1500 18 2310 18 0754   BP: 106/75 109/72 133/77 125/92   BP Location: Right arm Right arm Right arm Right arm   Pulse: 75 85 77 78   Resp: 17 18 20 16   Temp: 98 5 °F (36 9 °C) 97 8 °F (36 6 °C) 98 7 °F (37 1 °C) 98 3 °F (36 8 °C)   TempSrc: Oral Oral Oral Oral   SpO2: 98% 97% 98% 96%   Weight:       Height:        Body mass index is 31 99 kg/m²  I/O last 24 hours: In: 5 [P O :720]  Out: -       Physical Exam:     Physical Exam   Constitutional: He is oriented to person, place, and time  He appears well-developed and well-nourished  No distress  HENT:   Head: Normocephalic and atraumatic  Nose: Nose normal    Mouth/Throat: No oropharyngeal exudate  Eyes: Conjunctivae and EOM are normal  Pupils are equal, round, and reactive to light  No scleral icterus  Neck: Normal range of motion  No JVD present  Cardiovascular: Normal rate, regular rhythm, normal heart sounds and intact distal pulses  Exam reveals no gallop and no friction rub  No murmur heard  Pulmonary/Chest: Effort normal and breath sounds normal  No respiratory distress  He has no wheezes  He has no rales  He exhibits no tenderness  Abdominal: Soft  Bowel sounds are normal  He exhibits no distension  There is no tenderness  There is no guarding  Musculoskeletal: Normal range of motion  He exhibits no edema, tenderness or deformity  Lymphadenopathy:     He has no cervical adenopathy  Neurological: He is alert and oriented to person, place, and time  He displays normal reflexes  No cranial nerve deficit  Coordination normal    Skin: Skin is warm and dry  He is not diaphoretic  No erythema  Psychiatric: He has a normal mood and affect         Invasive Devices     Peripheral Intravenous Line            Peripheral IV 04/01/18 Left Wrist less than 1 day                          Labs:   Recent Results (from the past 24 hour(s))   CBC and differential    Collection Time: 04/02/18  5:03 AM   Result Value Ref Range    WBC 4 94 4 31 - 10 16 Thousand/uL    RBC 3 87 (L) 3 88 - 5 62 Million/uL    Hemoglobin 10 9 (L) 12 0 - 17 0 g/dL    Hematocrit 35 0 (L) 36 5 - 49 3 %    MCV 90 82 - 98 fL    MCH 28 2 26 8 - 34 3 pg    MCHC 31 1 (L) 31 4 - 37 4 g/dL    RDW 16 5 (H) 11 6 - 15 1 %    MPV 9 2 8 9 - 12 7 fL    Platelets 679 003 - 602 Thousands/uL    nRBC 0 /100 WBCs    Neutrophils Relative 45 43 - 75 %    Lymphocytes Relative 31 14 - 44 %    Monocytes Relative 19 (H) 4 - 12 %    Eosinophils Relative 4 0 - 6 %    Basophils Relative 1 0 - 1 %    Neutrophils Absolute 2 18 1 85 - 7 62 Thousands/µL    Lymphocytes Absolute 1 55 0 60 - 4 47 Thousands/µL    Monocytes Absolute 0 95 0 17 - 1 22 Thousand/µL    Eosinophils Absolute 0 18 0 00 - 0 61 Thousand/µL    Basophils Absolute 0 05 0 00 - 0 10 Thousands/µL   Basic metabolic panel    Collection Time: 04/02/18  5:03 AM   Result Value Ref Range    Sodium 139 136 - 145 mmol/L    Potassium 4 0 3 5 - 5 3 mmol/L    Chloride 107 100 - 108 mmol/L    CO2 23 21 - 32 mmol/L    Anion Gap 9 4 - 13 mmol/L    BUN 17 5 - 25 mg/dL    Creatinine 0 76 0 60 - 1 30 mg/dL    Glucose 98 65 - 140 mg/dL    Calcium 9 2 8 3 - 10 1 mg/dL    eGFR 113 ml/min/1 73sq m       Radiology Results: I have personally reviewed pertinent reports  Other Diagnostic Testing:   I have personally reviewed pertinent reports          Active Meds:   Current Facility-Administered Medications   Medication Dose Route Frequency    acetaminophen (TYLENOL) tablet 650 mg  650 mg Oral Q12H PRN    amLODIPine (NORVASC) tablet 5 mg  5 mg Oral Daily    folic acid (FOLVITE) tablet 400 mcg  400 mcg Oral Daily    heparin (porcine) subcutaneous injection 5,000 Units  5,000 Units Subcutaneous Q8H [address]    labetalol (NORMODYNE) injection 10 mg  10 mg Intravenous Q6H PRN    levETIRAcetam (KEPPRA) tablet 1,000 mg  1,000 mg Oral Q12H [address]    LORazepam (ATIVAN) 2 mg/mL injection 1 mg  1 mg Intravenous Q4H PRN    pantoprazole (PROTONIX) EC tablet 40 mg  40 mg Oral Early Morning    thiamine (VITAMIN B1) tablet 100 mg  100 mg Oral Daily         VTE Pharmacologic Prophylaxis: Heparin  VTE Mechanical Prophylaxis: sequential compression device    Cayden Cordova MD

## 2018-04-03 RX ADMIN — Medication 100 MG: at 08:24

## 2018-04-03 RX ADMIN — PANTOPRAZOLE SODIUM 40 MG: 40 TABLET, DELAYED RELEASE ORAL at 05:31

## 2018-04-03 RX ADMIN — LEVETIRACETAM 1000 MG: 500 TABLET ORAL at 22:24

## 2018-04-03 RX ADMIN — AMLODIPINE BESYLATE 5 MG: 5 TABLET ORAL at 08:24

## 2018-04-03 RX ADMIN — HEPARIN SODIUM 5000 UNITS: 5000 INJECTION, SOLUTION INTRAVENOUS; SUBCUTANEOUS at 14:08

## 2018-04-03 RX ADMIN — HEPARIN SODIUM 5000 UNITS: 5000 INJECTION, SOLUTION INTRAVENOUS; SUBCUTANEOUS at 22:24

## 2018-04-03 RX ADMIN — Medication 400 MCG: at 08:24

## 2018-04-03 RX ADMIN — LEVETIRACETAM 1000 MG: 500 TABLET ORAL at 08:24

## 2018-04-03 RX ADMIN — HEPARIN SODIUM 5000 UNITS: 5000 INJECTION, SOLUTION INTRAVENOUS; SUBCUTANEOUS at 05:31

## 2018-04-03 NOTE — PLAN OF CARE
DISCHARGE PLANNING     Discharge to home or other facility with appropriate resources Progressing        Knowledge Deficit     Patient/family/caregiver demonstrates understanding of disease process, treatment plan, medications, and discharge instructions Progressing        NEUROSENSORY - ADULT     Absence of seizures Progressing        Nutrition/Hydration-ADULT     Nutrient/Hydration intake appropriate for improving, restoring or maintaining nutritional needs Progressing        Potential for Falls     Patient will remain free of falls Progressing        Prexisting or High Potential for Compromised Skin Integrity     Skin integrity is maintained or improved Progressing

## 2018-04-03 NOTE — SOCIAL WORK
Cm continues to work on discharge planning  Patient continues to present as confused during assessments  Cm attempted several times to speak with patient in regards to emergency contacts, or last address  Patient was not able to give much information in regards to contact, but did state he is homeless  Last Friday, he explained to Cm through an interpretor that he has friends, but could/would not provide the names of said friends  When asked about locations he would spend time, he gave an address of  [address] in [place], [place]  Cm attempted to research this location but could not find any information or anyone associated with this patient  Cm discussed with  director about case, and she is in agreement with CM starting the guardinship process  Cm provided case information to clinical  specialist to start the guardianship process

## 2018-04-03 NOTE — PROGRESS NOTES
IM Residency Progress Note   Unit/Bed#: [facility] 920-01 Encounter: [id number]  SOD Team B       Barnie Simmonds 43 y o  male [id number]    Hospital Stay Days: 8      Assessment/Plan:    Principal Problem:    Acute encephalopathy  Active Problems:    Hypertension    Encephalomalacia    Head trauma    Status post craniectomy    Necrotic eschar    Hypophosphatemia    Seizure (HCC)    Acute encephalopathy 2/2 likely Seizure Activity secondary to encephalomalacia  Unlikely 2/2 alcohol withdrawal or intoxication  - Ast, ALT ratio downtrending  CIWA 0    - Improved encephalopathy  Does have a history of 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   Encephalomalacia seen on CT scan on 03/26   - Keppra 1 g po b i d   For now  - Continue thiamine and folate,  -B12 level 629, folate 19 6, TSH 2 53  - Ativan 1 mg q 6 hours p r n  for seizures and anxiety  - Neurology consulted for optimization of AEDs and outpatient follow-up   -patient will need access to [facility] in the outpatient setting will need case management assistance   Pt/OT  Activity as tolerated  -Patient evaluated by neuropsych, deemed incompetent      Dementia  Patient evaluated by neuropsych, deemed to have dementia, adjustment disorder, alcohol use     Patient does not have capacity to make fully informed decisions     If unable to contact family or next of kin, patient may require legal guardian  Medhat Melendrez have follow-up  -will discuss with case management      HTN  - BP stable   - Cont amlodipine 5 mg p o  Daily    - Labetalol 10 mg q 6 hours p r n  for SBP >160     Elevated LFT's on admission   Downtrending  Likely ast elevated from muscle breakdown  Will check CMP tomorrow  Likely due to increased CK (due to immobility)  Unclear history of acute alcohol use  Possible long term use  - Counseled on alcohol cessation  - Ammonia WNL, INR WNL        Anemia  Hemoglobin stable and platelets count improved to 240 Both improving since admission  Continue to follow  Unknown baseline   Could be in the setting of liver disease (alcoholic cirrhosis?)   No signs of active bleeding at the moment   - RUQ shows hepatomegaly and hepatic steatosis with incidental horshoe kidney  Patient should follow up outpatient            Necrotic eschar on left forarm - unclear etiology - likely trauma/burn  Pt notes it happened from trauma 3 weeks ago  differential for nectoric non tender eschar: trauma/burn (patient gives history of trauma 3 weeks ago) vs less likely Infection vs less likely arterial (less likely, as has good pulses, hepc?, Antiphospholipid syndrome?)  Infection differential includes (tick bite), although there is only one bite and not multiple, spider bite, unlikely deep fungal, unlikely calciphylaxis, unlikely anthrax     - TX as per wound care  May benefit from collagenase  Likely can hold off on debridement for now  Will hold off on plastic surgery consult for now  - monitor vs picture in chart from ED on admission    Disposition:  Continue inpatient care      Subjective:   Patient seen and examined  No acute complaints this time       Vitals: Temp (24hrs), Av 5 °F (36 9 °C), Min:98 °F (36 7 °C), Max:99 1 °F (37 3 °C)  Current: Temperature: 98 °F (36 7 °C)  Vitals:    18 2310 18 0754 18 1504 18 2300   BP: 133/77 125/92 118/71 117/73   BP Location: Right arm Right arm Right arm Right arm   Pulse: 77 78 80 66   Resp: 20 16 17 18   Temp: 98 7 °F (37 1 °C) 98 3 °F (36 8 °C) 99 1 °F (37 3 °C) 98 °F (36 7 °C)   TempSrc: Oral Oral Oral Oral   SpO2: 98% 96% 97% 99%   Weight:       Height:        Body mass index is 31 99 kg/m²  I/O last 24 hours: In: 36 [P O :618]  Out: -       Physical Exam:   Physical Exam   Constitutional: He is oriented to person, place, and time  He appears well-developed and well-nourished  No distress  HENT:   Head: Normocephalic and atraumatic     Nose: Nose normal    Mouth/Throat: No oropharyngeal exudate  Eyes: Conjunctivae and EOM are normal  Pupils are equal, round, and reactive to light  No scleral icterus  Neck: Normal range of motion  No JVD present  Cardiovascular: Normal rate, regular rhythm, normal heart sounds and intact distal pulses  Exam reveals no gallop and no friction rub  No murmur heard  Pulmonary/Chest: Effort normal and breath sounds normal  No respiratory distress  He has no wheezes  He has no rales  He exhibits no tenderness  Abdominal: Soft  Bowel sounds are normal  He exhibits no distension  There is no tenderness  There is no guarding  Musculoskeletal: Normal range of motion  He exhibits no edema, tenderness or deformity  Lymphadenopathy:     He has no cervical adenopathy  Neurological: He is alert and oriented to person, place, and time  He displays normal reflexes  No cranial nerve deficit  Coordination normal    Skin: Skin is warm and dry  He is not diaphoretic  No erythema  Psychiatric: He has a normal mood and affect  Invasive Devices     Peripheral Intravenous Line            Peripheral IV 04/01/18 Left Wrist 1 day                          Labs:   No results found for this or any previous visit (from the past 24 hour(s))  Radiology Results: I have personally reviewed pertinent reports  Other Diagnostic Testing:   I have personally reviewed pertinent reports          Active Meds:   Current Facility-Administered Medications   Medication Dose Route Frequency    acetaminophen (TYLENOL) tablet 650 mg  650 mg Oral Q12H PRN    amLODIPine (NORVASC) tablet 5 mg  5 mg Oral Daily    folic acid (FOLVITE) tablet 400 mcg  400 mcg Oral Daily    heparin (porcine) subcutaneous injection 5,000 Units  5,000 Units Subcutaneous Q8H [address]    labetalol (NORMODYNE) injection 10 mg  10 mg Intravenous Q6H PRN    levETIRAcetam (KEPPRA) tablet 1,000 mg  1,000 mg Oral Q12H ABELINO    LORazepam (ATIVAN) 2 mg/mL injection 1 mg  1 mg Intravenous Q4H PRN    pantoprazole (PROTONIX) EC tablet 40 mg  40 mg Oral Early Morning    thiamine (VITAMIN B1) tablet 100 mg  100 mg Oral Daily         VTE Pharmacologic Prophylaxis: Heparin  VTE Mechanical Prophylaxis: sequential compression device    Pauline Flannery MD

## 2018-04-03 NOTE — SOCIAL WORK
Cm met with patient again this morning to see if he would be able to provide Cm with any additional information  When Cm questioned about an address or a location that patient sends most of his time, he reported that he stays around an area where the "train does not run " When questioned for an address, patient provided Cm with another address of [address] [place] [address]  Cm will explore this address through [employer]  Patient reported when questioned that he does have family, but that they are not here and he would not provide any names  Patient denied having a social security number but stated that [place] last 5 years  Patient informed that guardianship(explained meaning) would be sought  He at this time patient just nodded his head, but did not give a verbal response  Guardianship process was requested to start

## 2018-04-03 NOTE — CASE MANAGEMENT
Continued Stay Review    Date: 4/3    Vital Signs: /72   Pulse 78   Temp 98 5 °F (36 9 °C)   Resp 18   Ht 5' (1 524 m)   Wt 74 3 kg (163 lb 12 8 oz)   SpO2 99%   BMI 31 99 kg/m²     Medications:   Scheduled Meds:   Current Facility-Administered Medications:  amLODIPine 5 mg Oral Daily   folic acid 993 mcg Oral Daily   heparin (porcine) 5,000 Units Subcutaneous Q8H ABELINO   levETIRAcetam 1,000 mg Oral Q12H ABELINO   pantoprazole 40 mg Oral Early Morning   thiamine 100 mg Oral Daily     Continuous Infusions:    PRN Meds:   acetaminophen    labetalol    LORazepam    Abnormal Labs/Diagnostic Results:    04/02/18 0503    WBC 4 31 - 10 16 Thousand/uL 4 94    RBC 3 88 - 5 62 Million/uL 3 87     Hemoglobin 12 0 - 17 0 g/dL 10 9     Hematocrit 36 5 - 49 3 % 35 0       Age/Sex: 43 y o  male     Assessment/Plan:   Principal Problem:    Acute encephalopathy  Active Problems:    Hypertension    Encephalomalacia    Head trauma    Status post craniectomy    Necrotic eschar    Hypophosphatemia    Seizure (HCC)     Acute encephalopathy 2/2 likely Seizure Activity secondary to encephalomalacia  Unlikely 2/2 alcohol withdrawal or intoxication  - Ast, ALT ratio downtrending  CIWA 0    - Improved encephalopathy  Does have a history of 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   Encephalomalacia seen on CT scan on 03/26   - Keppra 1 g po b i d   For now  - Continue thiamine and folate,  -B12 level 629, folate 19 6, TSH 2 53  - Ativan 1 mg q 6 hours p r n  for seizures and anxiety  - Neurology consulted for optimization of AEDs and outpatient follow-up   -patient will need access to [employer] in the outpatient setting will need case management assistance   Pt/OT   Activity as tolerated  -Patient evaluated by neuropsych, deemed incompetent      Dementia  Patient evaluated by neuropsych, deemed to have dementia, adjustment disorder, alcohol use     Patient does not have capacity to make fully informed decisions     If unable to contact family or next of kin, patient may require legal guardian  Holly Kerns have follow-up  -will discuss with case management      HTN  - BP stable   - Cont amlodipine 5 mg p o  Daily    - Labetalol 10 mg q 6 hours p r n  for SBP >160     Elevated LFT's on admission   Downtrending  Likely ast elevated from muscle breakdown  Will check CMP tomorrow  Likely due to increased CK (due to immobility)  Unclear history of acute alcohol use  Possible long term use  - Counseled on alcohol cessation  - Ammonia WNL, INR WNL      Anemia  Hemoglobin stable and platelets count improved to 240 Both improving since admission  Continue to follow  Unknown baseline   Could be in the setting of liver disease (alcoholic cirrhosis?)   No signs of active bleeding at the moment   - RUQ shows hepatomegaly and hepatic steatosis with incidental horshoe kidney  Patient should follow up outpatient       Necrotic eschar on left forarm - unclear etiology - likely trauma/burn  Pt notes it happened from trauma 3 weeks ago  differential for nectoric non tender eschar: trauma/burn (patient gives history of trauma 3 weeks ago) vs less likely Infection vs less likely arterial (less likely, as has good pulses, hepc?, Antiphospholipid syndrome?)  Infection differential includes (tick bite), although there is only one bite and not multiple, spider bite, unlikely deep fungal, unlikely calciphylaxis, unlikely anthrax     - TX as per wound care  May benefit from collagenase  Likely can hold off on debridement for now  Will hold off on plastic surgery consult for now    - monitor vs picture in chart from ED on admission     Disposition:  Continue inpatient care    Discharge Plan: TBD

## 2018-04-03 NOTE — SOCIAL WORK
CSWS met with patient to attempt to locate family   #[id number] was also present to interpret for CSWS  Patient reports he has children:  Teresa Hoffman, age 25, daughter  Jelenatar, age 23, son  Edenilson Parra, age 13, daughter  Nata Guo, age 15, daughter    Patient reports he has a wife, Мария Mclaughlin  His children and wife are located in [facility]   Internet Searches of children and wife were not successful  Patient states he was living with his brother in law (Norma Oliva) and sister Ray Cisneros) approximately 3 years ago  The last contact her had with his sister and brother in law was 3 months ago when he saw them at the "train stop at [facility] " Patient reports he was homeless and living at the train stop  Patient reports the address: [address] [place] [address] in [address] was a previous address of his sister, Dmitry Bedolla  Patient states he came here from [place] to [place] with his friend, Emily President  Patient reports he came to [address] [place] [address] for a job  He was unable to inform CSWS of his employment name or contact information  CSWS conducted an Internet search to locate patient's friend  One contact number ([phone])QBJ the option to leave a voicemail  CSWS left a voicemail and to inquire about family contacts  CSWS asked about another name that patient gave staff, Arnulfo Núñez  Patient states he is unaware who this is  Patient had delayed responses during conversation  Patient was able to identify that he was in a hospital but believed he was in [facility] informed patient that he was in [address] [place] [address]  CSWS explain to patient the need for someone to assist with discharge planning and that if there are no family or friends available CSWS would need to possibly pursue guardianship to secure an individual to assist with discharge planning       CSWS left a voicemail for Rashmi Pride with [facility] to review case and discuss the options for discharge planning including possible guardianship  Barriers include: patient is homeless, without income or health insurance  Patient is currently on 1:1 due to elopement concerns secondary to cognitive impairments (confusion)  Patient is reportedly independent with ADL's and ambulation

## 2018-04-04 RX ORDER — AMLODIPINE BESYLATE 5 MG/1
5 TABLET ORAL DAILY
Qty: 30 TABLET | Refills: 0 | Status: SHIPPED | OUTPATIENT
Start: 2018-04-05 | End: 2018-04-10

## 2018-04-04 RX ORDER — CARBAMAZEPINE 200 MG/1
200 TABLET ORAL 2 TIMES DAILY
Status: DISCONTINUED | OUTPATIENT
Start: 2018-04-04 | End: 2018-04-11 | Stop reason: HOSPADM

## 2018-04-04 RX ORDER — LEVETIRACETAM 1000 MG/1
1000 TABLET ORAL EVERY 12 HOURS SCHEDULED
Qty: 60 TABLET | Refills: 0 | Status: SHIPPED | OUTPATIENT
Start: 2018-04-04 | End: 2018-04-10 | Stop reason: HOSPADM

## 2018-04-04 RX ORDER — PANTOPRAZOLE SODIUM 40 MG/1
40 TABLET, DELAYED RELEASE ORAL
Qty: 30 TABLET | Refills: 0 | Status: SHIPPED | OUTPATIENT
Start: 2018-04-05 | End: 2018-04-10 | Stop reason: HOSPADM

## 2018-04-04 RX ADMIN — Medication 400 MCG: at 08:03

## 2018-04-04 RX ADMIN — ACETAMINOPHEN 650 MG: 325 TABLET, FILM COATED ORAL at 22:24

## 2018-04-04 RX ADMIN — PANTOPRAZOLE SODIUM 40 MG: 40 TABLET, DELAYED RELEASE ORAL at 06:08

## 2018-04-04 RX ADMIN — CARBAMAZEPINE 200 MG: 200 TABLET ORAL at 22:19

## 2018-04-04 RX ADMIN — HEPARIN SODIUM 5000 UNITS: 5000 INJECTION, SOLUTION INTRAVENOUS; SUBCUTANEOUS at 13:10

## 2018-04-04 RX ADMIN — LEVETIRACETAM 1000 MG: 500 TABLET ORAL at 08:02

## 2018-04-04 RX ADMIN — HEPARIN SODIUM 5000 UNITS: 5000 INJECTION, SOLUTION INTRAVENOUS; SUBCUTANEOUS at 06:08

## 2018-04-04 RX ADMIN — HEPARIN SODIUM 5000 UNITS: 5000 INJECTION, SOLUTION INTRAVENOUS; SUBCUTANEOUS at 22:19

## 2018-04-04 RX ADMIN — Medication 100 MG: at 08:02

## 2018-04-04 RX ADMIN — AMLODIPINE BESYLATE 5 MG: 5 TABLET ORAL at 08:03

## 2018-04-04 NOTE — SOCIAL WORK
CSWS consulted Ricky Arnett with legal services about patient's situation and discussed possible guardianship  Discussed patient's current functioning patient has been independent at baseline completing all ADL's and able to express his needs to staff  Ricky Arnett advised CSWS to review this with patient's attending physician that is taking care of patient  CSWS spoke with SALLY, Dr Sola Hercules to discuss the above information  Per Dr Sola Hercules he has been in contact with Dr Ray Cheatham and felt patient is competent but will address this with Dr Ray Cheatham to discuss this further  Update 1:30 PM: patient has capacity to make medical decisions at this time, please see note written by Dr oSla Hercules and Dr Ray Cheatham on 4/4/18    CSWS spoke with Tangela Montes at the [facility] [employer] ([phone])  CSWS provided patient's name and date of birth  Also provided the additional name of "[facility]" which patient had also given to staff earlier during his stay  Mr Garcia Harris will investigate if patient has any current warrants out for his arrest  Most likely he will send a representative from [employer] to speak with patient

## 2018-04-04 NOTE — SOCIAL WORK
CSWS spoke with SOD, Dr Viviana Santiago to discuss discharge planning  CSWS met with patient to discuss discharge planning  Interpretor #[id number] was also present via phone  Discussed discharge plan of patient being transported to [facility] (where he was at prior to admission) so that he can take a return trip to [facility]  CSWS explained this to patient, and inquired if patient had questions or concerns about his discharge plan  Patient did not have any questions about the plan  CSWS completed the Financial Assistance Eligible Service Request, faxed to [facility]  Medication cost: $254 44 and approved by Mika Peralta

## 2018-04-04 NOTE — WOUND OSTOMY CARE
Progress Note - Wound   Erica Harper 43 y o  male MRN: [id number]  [facility] Encounter: [id number]      Assessment: Patient is seen for weekly skin assessment of the skin   The patient is in bed for the assessment   He ambulates on the unit   Sacral / buttocks and heels all intact   The left inner forearm wound has a stable dry eschar with no erythema , no drainage and no pain   Will continue with the same plan of care   Plan:   1  Apply skin nourishing cream to the skin daily   2  Apply hydrgaurad bid and prn to sacral / buttocks and bilateral heels   3  Turn and reposition every 2 hours to off load and prevent pressure   4  Elevate heel off of  the bed with pillows  5  Left arm - apply betadine paint to the dry eschar on the left inner forearm daily   If the area opens or drains contact wound care please           Objective:    Vitals: Blood pressure 116/71, pulse 75, temperature 98 8 °F (37 1 °C), temperature source Oral, resp  rate 18, height 5' (1 524 m), weight 74 3 kg (163 lb 12 8 oz), SpO2 97 %  ,Body mass index is 31 99 kg/m²  Wound 03/26/18 Abrasion(s) Arm Left black, hard induration (Active)   Wound Description Clean;Dry;Brown 4/4/2018  3:00 PM   Loraine-wound Assessment Clean;Dry; Intact 4/4/2018  3:00 PM   Shape triangular 4/2/2018  4:17 PM   Wound Length (cm) 3 5 cm 4/4/2018  3:00 PM   Wound Width (cm) 2 7 cm 4/4/2018  3:00 PM   Drainage Amount None 4/4/2018  3:00 PM   Non-staged Wound Description Not applicable 4/1/0413  3:90 PM   Treatments Site care 4/4/2018  7:58 AM   Dressing Other (Comment) 4/4/2018  3:00 PM   Patient Tolerance Tolerated well 4/4/2018  3:00 PM   Dressing Status Open to air 4/4/2018  7:58 AM       Wound care will follow weekly Elsa Reyes RN BSN CWOCN

## 2018-04-04 NOTE — PLAN OF CARE
DISCHARGE PLANNING     Discharge to home or other facility with appropriate resources Adequate for Discharge        Knowledge Deficit     Patient/family/caregiver demonstrates understanding of disease process, treatment plan, medications, and discharge instructions Adequate for Discharge        NEUROSENSORY - ADULT     Absence of seizures Adequate for Discharge        Nutrition/Hydration-ADULT     Nutrient/Hydration intake appropriate for improving, restoring or maintaining nutritional needs Adequate for Discharge        Potential for Falls     Patient will remain free of falls Adequate for Discharge        Prexisting or High Potential for Compromised Skin Integrity     Skin integrity is maintained or improved Adequate for Discharge

## 2018-04-04 NOTE — PROGRESS NOTES
I have conducted an independent evaluation of this patient's competence with my Attending present  It is felt that Mr Rosalie Farmer possesses sufficient understanding to make, execute, and communicate medical  decisions on his own behalf

## 2018-04-04 NOTE — PROGRESS NOTES
IM Residency Progress Note   Unit/Bed#: [facility] 920-01 Encounter: [id number]  SOD Team B       Katelyn Linda 43 y o  male [id number]    Hospital Stay Days: 9      Assessment/Plan:    Principal Problem:    Acute encephalopathy  Active Problems:    Hypertension    Encephalomalacia    Head trauma    Status post craniectomy    Necrotic eschar    Hypophosphatemia    Seizure (HCC)    Acute encephalopathy 2/2 likely Seizure Activity secondary to encephalomalacia  Unlikely 2/2 alcohol withdrawal or intoxication  - Ast, ALT ratio downtrending  CIWA 0    - Improved encephalopathy  Does have a history of 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   Encephalomalacia seen on CT scan on    - Keppra 1 g po b i d   For now  - Continue thiamine and folate,  -B12 level 629, folate 19 6, TSH 2 53  - Ativan 1 mg q 6 hours p r n  for seizures and anxiety  - Neurology consulted for optimization of AEDs and outpatient follow-up   -patient will need access to [employer] in the outpatient setting will need case management assistance   Pt/OT  Activity as tolerated  -Patient evaluated by neuropsych, deemed incompetent      Dementia  Patient evaluated by neuropsych, deemed to have dementia, adjustment disorder, alcohol use     Patient does not have capacity to make fully informed decisions     If unable to contact family or next of kin, patient may require legal guardian  Heather Lindsay have follow-up  -will discuss with case management      HTN  - BP stable   - Cont amlodipine 5 mg p o  Daily    - Labetalol 10 mg q 6 hours p r n  for SBP >160    Disposition:  Continue inpatient care       Subjective:   Patient seen examined  No acute overnight events    No acute complaints at this time       Vitals: Temp (24hrs), Av 4 °F (36 9 °C), Min:97 9 °F (36 6 °C), Max:98 7 °F (37 1 °C)  Current: Temperature: 98 7 °F (37 1 °C)  Vitals:    18 0824 18 1514 18 1611 18 2300   BP: 120/79 117/72  102/65   BP Location:    Right arm   Pulse: 71 78  76   Resp: 18 18  20   Temp: 97 9 °F (36 6 °C) 98 5 °F (36 9 °C)  98 7 °F (37 1 °C)   TempSrc:    Oral   SpO2: 98% 99% 99% 98%   Weight:       Height:        Body mass index is 31 99 kg/m²  I/O last 24 hours: In: 1360 [P O :1360]  Out: -       Physical Exam:   Physical Exam   Constitutional: He is oriented to person, place, and time  He appears well-developed and well-nourished  No distress  HENT:   Head: Normocephalic and atraumatic  Nose: Nose normal    Mouth/Throat: No oropharyngeal exudate  Eyes: Conjunctivae and EOM are normal  Pupils are equal, round, and reactive to light  No scleral icterus  Neck: Normal range of motion  No JVD present  Cardiovascular: Normal rate, regular rhythm, normal heart sounds and intact distal pulses  Exam reveals no gallop and no friction rub  No murmur heard  Pulmonary/Chest: Effort normal and breath sounds normal  No respiratory distress  He has no wheezes  He has no rales  He exhibits no tenderness  Abdominal: Soft  Bowel sounds are normal  He exhibits no distension  There is no tenderness  There is no guarding  Musculoskeletal: Normal range of motion  He exhibits no edema, tenderness or deformity  Lymphadenopathy:     He has no cervical adenopathy  Neurological: He is alert and oriented to person, place, and time  He displays normal reflexes  No cranial nerve deficit  Coordination normal    Skin: Skin is warm and dry  He is not diaphoretic  No erythema  Psychiatric: He has a normal mood and affect  Invasive Devices     Peripheral Intravenous Line            Peripheral IV 04/01/18 Left Wrist 2 days                          Labs: No results found for this or any previous visit (from the past 24 hour(s))  Radiology Results: I have personally reviewed pertinent reports  Other Diagnostic Testing:   I have personally reviewed pertinent reports          Active Meds:   Current Facility-Administered Medications   Medication Dose Route Frequency    acetaminophen (TYLENOL) tablet 650 mg  650 mg Oral Q12H PRN    amLODIPine (NORVASC) tablet 5 mg  5 mg Oral Daily    folic acid (FOLVITE) tablet 400 mcg  400 mcg Oral Daily    heparin (porcine) subcutaneous injection 5,000 Units  5,000 Units Subcutaneous Q8H [address]    labetalol (NORMODYNE) injection 10 mg  10 mg Intravenous Q6H PRN    levETIRAcetam (KEPPRA) tablet 1,000 mg  1,000 mg Oral Q12H ABELINO    LORazepam (ATIVAN) 2 mg/mL injection 1 mg  1 mg Intravenous Q4H PRN    pantoprazole (PROTONIX) EC tablet 40 mg  40 mg Oral Early Morning    thiamine (VITAMIN B1) tablet 100 mg  100 mg Oral Daily         VTE Pharmacologic Prophylaxis: Heparin  VTE Mechanical Prophylaxis: sequential compression device    Spenser Sommer MD

## 2018-04-04 NOTE — SOCIAL WORK
CSWS spoke with RN, Rex Kim to discuss discharge plan of patient being transported to [facility]  where he can take the bus back to [facility]  Rex Kim informed CSWS that patient was recommended change his medication from Magdiel Griffes to Carbamazepine since this medication is less expensive  SOD was requested to speak with nursing directly about these concerns  SNEHA spoke with nursing and then contacted CSWS to inform that discharge was cancelled for today while medication is changed and 1:1 will also be discontinued

## 2018-04-04 NOTE — PROGRESS NOTES
Per Rudi Manual with case management pt was 'safe' to be discharged to the [facility] and hospital will pay for medication  SOD was paged at this time, I do  not believe pt is safe for discharge and per neurology note from 3/28 pt should be started on tegratol since it is a $4 medication from [employer] and pt is homeless

## 2018-04-04 NOTE — PROGRESS NOTES
Patient's discharge slated for today 4/4/18 cancelled due to concerns raised by patient's care team   Pt will require additional stay for medical management/safe discharge plan  Plan to d/c Keppra as medication is too expensive for patient to be discharged on   Will start Carbamezapine 200mg BID in the hospital per Neuro earlier recommendations  Patient now without thrombocytopenia - will monitor platelet ct while pt in hospital   Will d/c 1:1 continuous observation at this time to assess patient's suitability for discharge in the coming days    Will follow-up with CM regarding safe discharge plan when medically stable    Barriers identified for safe discharge: Affordability of medications, homeless status with reports being from [facility], medical decision-making capacity

## 2018-04-04 NOTE — SOCIAL WORK
CSWS spoke with Agent Prosper Taveras at [facility] [employer]  He reviewed the information with his supervisor and at they are not going to take this case and patient is "free to be discharged at any time " CSWS spoke with CM Manager, Andreia Pulliam and CM Director KUSH Crockett to discuss above information  Ad[facility] to speak with [facility] to [facility] the [facility] so patient can take the bus back to [place]

## 2018-04-05 LAB
ERYTHROCYTE [DISTWIDTH] IN BLOOD BY AUTOMATED COUNT: 16.1 % (ref 11.6–15.1)
HCT VFR BLD AUTO: 36.3 % (ref 36.5–49.3)
HGB BLD-MCNC: 11.2 G/DL (ref 12–17)
MCH RBC QN AUTO: 28.1 PG (ref 26.8–34.3)
MCHC RBC AUTO-ENTMCNC: 30.9 G/DL (ref 31.4–37.4)
MCV RBC AUTO: 91 FL (ref 82–98)
PLATELET # BLD AUTO: 323 THOUSANDS/UL (ref 149–390)
PMV BLD AUTO: 9.1 FL (ref 8.9–12.7)
RBC # BLD AUTO: 3.98 MILLION/UL (ref 3.88–5.62)
WBC # BLD AUTO: 4.64 THOUSAND/UL (ref 4.31–10.16)

## 2018-04-05 PROCEDURE — 85027 COMPLETE CBC AUTOMATED: CPT | Performed by: INTERNAL MEDICINE

## 2018-04-05 RX ADMIN — HEPARIN SODIUM 5000 UNITS: 5000 INJECTION, SOLUTION INTRAVENOUS; SUBCUTANEOUS at 05:17

## 2018-04-05 RX ADMIN — HEPARIN SODIUM 5000 UNITS: 5000 INJECTION, SOLUTION INTRAVENOUS; SUBCUTANEOUS at 21:43

## 2018-04-05 RX ADMIN — HEPARIN SODIUM 5000 UNITS: 5000 INJECTION, SOLUTION INTRAVENOUS; SUBCUTANEOUS at 14:56

## 2018-04-05 RX ADMIN — Medication 400 MCG: at 08:01

## 2018-04-05 RX ADMIN — PANTOPRAZOLE SODIUM 40 MG: 40 TABLET, DELAYED RELEASE ORAL at 05:17

## 2018-04-05 RX ADMIN — CARBAMAZEPINE 200 MG: 200 TABLET ORAL at 18:07

## 2018-04-05 RX ADMIN — CARBAMAZEPINE 200 MG: 200 TABLET ORAL at 08:01

## 2018-04-05 RX ADMIN — Medication 100 MG: at 08:01

## 2018-04-05 NOTE — PROGRESS NOTES
Gabriel Lyn in regards possible contact information for family members or friends  Pt  States that he shares an apartment with friends but no one has a phone number that we can reach out  Pt  Denies to have any family living in this country  All interaction completed in [address]

## 2018-04-05 NOTE — PROGRESS NOTES
IM Residency Progress Note   Unit/Bed#: [facility] 920-01 Encounter: [id number]  SOD Team B       Kim Divers 43 y o  male [address] Stay Days: 10      Assessment/Plan:    Principal Problem:    Acute encephalopathy  Active Problems:    Hypertension    Encephalomalacia    Head trauma    Status post craniectomy    Necrotic eschar    Hypophosphatemia    Seizure (HCC)    Acute encephalopathy 2/ likely Seizure Activity secondary to encephalomalacia    - Improved encephalopathy  Does have a history of 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   Encephalomalacia seen on CT scan on    - Keppra 1 g po b i d  Switched to carbamazepine  affordability  - Ativan 1 mg q 6 hours p r n  for seizures and anxiety  -patient will need access to AED in the outpatient setting will need case management assistance   Pt/OT  Activity as tolerated  -Patient evaluated by neuropsych, deemed incompetent      Dementia  Patient evaluated by neuropsych, deemed to have dementia, adjustment disorder, alcohol use     Patient does not have capacity to make fully informed decisions     If unable to contact family or next of kin, patient may require legal guardian  Sheila Anne have follow-up  -will discuss with case management      HTN  - BP stable   - Cont amlodipine 5 mg p o  Daily    - Labetalol 10 mg q 6 hours p r n  for SBP >160    Disposition:  Will discuss with case management about discharge       Subjective:   Patient seen examined    No acute complaints at this time       Vitals: Temp (24hrs), Av 7 °F (37 1 °C), Min:98 5 °F (36 9 °C), Max:98 8 °F (37 1 °C)  Current: Temperature: 98 7 °F (37 1 °C)  Vitals:    18 2300 18 0700 18 1500 18 2325   BP: 102/65 114/76 116/71 101/67   BP Location: Right arm Right arm Right arm Right arm   Pulse: 76 66 75 82   Resp:    Temp: 98 7 °F (37 1 °C) 98 5 °F (36 9 °C) 98 8 °F (37 1 °C) 98 7 °F (37 1 °C)   TempSrc: Oral Oral Oral Oral   SpO2: 98% 98% 97% 99%   Weight:       Height:        Body mass index is 31 99 kg/m²  I/O last 24 hours: In: 5965 [P O :1460]  Out: -       Physical Exam:     Physical Exam   Constitutional: He is oriented to person, place, and time  He appears well-developed and well-nourished  No distress  HENT:   Head: Normocephalic and atraumatic  Nose: Nose normal    Mouth/Throat: No oropharyngeal exudate  Eyes: Conjunctivae and EOM are normal  Pupils are equal, round, and reactive to light  No scleral icterus  Neck: Normal range of motion  No JVD present  Cardiovascular: Normal rate, regular rhythm, normal heart sounds and intact distal pulses  Exam reveals no gallop and no friction rub  No murmur heard  Pulmonary/Chest: Effort normal and breath sounds normal  No respiratory distress  He has no wheezes  He has no rales  He exhibits no tenderness  Abdominal: Soft  Bowel sounds are normal  He exhibits no distension  There is no tenderness  There is no guarding  Musculoskeletal: Normal range of motion  He exhibits no edema, tenderness or deformity  Lymphadenopathy:     He has no cervical adenopathy  Neurological: He is alert and oriented to person, place, and time  He displays normal reflexes  No cranial nerve deficit  Coordination normal    Skin: Skin is warm and dry  He is not diaphoretic  No erythema  Psychiatric: He has a normal mood and affect         Invasive Devices          No matching active lines, drains, or airways                    Labs:   Recent Results (from the past 24 hour(s))   CBC and Platelet    Collection Time: 04/05/18  4:48 AM   Result Value Ref Range    WBC 4 64 4 31 - 10 16 Thousand/uL    RBC 3 98 3 88 - 5 62 Million/uL    Hemoglobin 11 2 (L) 12 0 - 17 0 g/dL    Hematocrit 36 3 (L) 36 5 - 49 3 %    MCV 91 82 - 98 fL    MCH 28 1 26 8 - 34 3 pg    MCHC 30 9 (L) 31 4 - 37 4 g/dL    RDW 16 1 (H) 11 6 - 15 1 %    Platelets 112 189 - 718 Thousands/uL    MPV 9 1 8 9 - 12 7 fL Radiology Results: I have personally reviewed pertinent reports  Other Diagnostic Testing:   I have personally reviewed pertinent reports          Active Meds:   Current Facility-Administered Medications   Medication Dose Route Frequency    acetaminophen (TYLENOL) tablet 650 mg  650 mg Oral Q12H PRN    amLODIPine (NORVASC) tablet 5 mg  5 mg Oral Daily    carBAMazepine (TEGretol) tablet 200 mg  200 mg Oral BID    folic acid (FOLVITE) tablet 400 mcg  400 mcg Oral Daily    heparin (porcine) subcutaneous injection 5,000 Units  5,000 Units Subcutaneous Q8H [address]    labetalol (NORMODYNE) injection 10 mg  10 mg Intravenous Q6H PRN    LORazepam (ATIVAN) 2 mg/mL injection 1 mg  1 mg Intravenous Q4H PRN    pantoprazole (PROTONIX) EC tablet 40 mg  40 mg Oral Early Morning    thiamine (VITAMIN B1) tablet 100 mg  100 mg Oral Daily         VTE Pharmacologic Prophylaxis: Heparin  VTE Mechanical Prophylaxis: sequential compression device    Jean Barber MD

## 2018-04-05 NOTE — SOCIAL WORK
CSWS spoke with SOD to discuss discharge planning  Patient is now off 1:1 and will be monitored for medication changes  Possible discharge 4/6/18 or 4/7/18  CHARLEE Bob updated of above information

## 2018-04-05 NOTE — SOCIAL WORK
CSWS spoke with Legal Services, Clinical Risk Management, CM Manager, MSW CM and SOD to schedule a meeting for Monday, 4/9/18 at 10 am  Location is to be determined

## 2018-04-06 LAB
ERYTHROCYTE [DISTWIDTH] IN BLOOD BY AUTOMATED COUNT: 16.3 % (ref 11.6–15.1)
HCT VFR BLD AUTO: 36.6 % (ref 36.5–49.3)
HGB BLD-MCNC: 11.2 G/DL (ref 12–17)
MCH RBC QN AUTO: 27.9 PG (ref 26.8–34.3)
MCHC RBC AUTO-ENTMCNC: 30.6 G/DL (ref 31.4–37.4)
MCV RBC AUTO: 91 FL (ref 82–98)
PLATELET # BLD AUTO: 370 THOUSANDS/UL (ref 149–390)
PMV BLD AUTO: 9.3 FL (ref 8.9–12.7)
RBC # BLD AUTO: 4.02 MILLION/UL (ref 3.88–5.62)
WBC # BLD AUTO: 4.85 THOUSAND/UL (ref 4.31–10.16)

## 2018-04-06 PROCEDURE — 85027 COMPLETE CBC AUTOMATED: CPT | Performed by: INTERNAL MEDICINE

## 2018-04-06 RX ADMIN — HEPARIN SODIUM 5000 UNITS: 5000 INJECTION, SOLUTION INTRAVENOUS; SUBCUTANEOUS at 21:50

## 2018-04-06 RX ADMIN — Medication 100 MG: at 09:22

## 2018-04-06 RX ADMIN — Medication 400 MCG: at 09:21

## 2018-04-06 RX ADMIN — CARBAMAZEPINE 200 MG: 200 TABLET ORAL at 09:22

## 2018-04-06 RX ADMIN — HEPARIN SODIUM 5000 UNITS: 5000 INJECTION, SOLUTION INTRAVENOUS; SUBCUTANEOUS at 05:08

## 2018-04-06 RX ADMIN — PANTOPRAZOLE SODIUM 40 MG: 40 TABLET, DELAYED RELEASE ORAL at 05:09

## 2018-04-06 RX ADMIN — AMLODIPINE BESYLATE 5 MG: 5 TABLET ORAL at 09:22

## 2018-04-06 RX ADMIN — HEPARIN SODIUM 5000 UNITS: 5000 INJECTION, SOLUTION INTRAVENOUS; SUBCUTANEOUS at 14:55

## 2018-04-06 RX ADMIN — CARBAMAZEPINE 200 MG: 200 TABLET ORAL at 18:48

## 2018-04-06 NOTE — SOCIAL WORK
CSWS spoke with Dr Pat Troncoso from SOD to discuss attending physician Dr Zavaleta Generous request to have him represent SOD in the meeting on Monday with Legal Services, CM and clinical risk management  Discussed that an attending needs to be present in this meeting on Monday 4/9/18 at 10 am in the P8 conference room  CSWS notified CM Manager, Ailyn Chávez and MSW CmTamra of above information

## 2018-04-06 NOTE — PROGRESS NOTES
IM Residency Progress Note   Unit/Bed#: [facility] 920-01 Encounter: [id number]  SOD Team B       Katelyn Linda 43 y o  male [id number]    Hospital Stay Days: 11      Assessment/Plan:    Principal Problem:    Acute encephalopathy  Active Problems:    Hypertension    Encephalomalacia    Head trauma    Status post craniectomy    Necrotic eschar    Hypophosphatemia    Seizure (HCC)    Acute encephalopathy 2/ likely Seizure Activity secondary to encephalomalacia     - Improved encephalopathy  Does have a history of 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   Encephalomalacia seen on CT scan on    - Keppra 1 g po b i d  Switched to carbamazepine  affordability  - Ativan 1 mg q 6 hours p r n  for seizures and anxiety  -patient will need access to AED in the outpatient setting will need case management assistance   Pt/OT  Activity as tolerated  -Patient evaluated by neuropsych, deemed incompetent      Dementia  Patient evaluated by neuropsych, deemed to have dementia, adjustment disorder, alcohol use     Patient does not have capacity to make fully informed decisions     If unable to contact family or next of kin, patient may require legal guardian  Heather Lindsay have follow-up  -will discuss with case management      HTN  - BP stable   - Cont amlodipine 5 mg p o   Daily    - Labetalol 10 mg q 6 hours p r n  for SBP >160     Disposition:  Will discuss with case management about discharge        Disposition: Continue in patient care      Subjective:   No acute complaints       Vitals: Temp (24hrs), Av 8 °F (37 1 °C), Min:98 7 °F (37 1 °C), Max:98 8 °F (37 1 °C)  Current: Temperature: 98 8 °F (37 1 °C)  Vitals:    18 2325 18 0732 18 1516 18 2249   BP: 101/67 107/59 128/80 137/85   BP Location: Right arm   Right arm   Pulse: 82 72 75 80   Resp:  18   Temp: 98 7 °F (37 1 °C) 98 9 °F (37 2 °C) 98 7 °F (37 1 °C) 98 8 °F (37 1 °C)   TempSrc: Oral   Oral   SpO2: 99% 99% 99% 98%   Weight:       Height:        Body mass index is 31 99 kg/m²  I/O last 24 hours: In: 26 [P O :1213]  Out: -       Physical Exam:     Physical Exam   Constitutional: He appears well-developed and well-nourished  No distress  HENT:   Head: Normocephalic and atraumatic  Nose: Nose normal    Mouth/Throat: No oropharyngeal exudate  Eyes: Conjunctivae and EOM are normal  Pupils are equal, round, and reactive to light  No scleral icterus  Neck: Normal range of motion  No JVD present  Cardiovascular: Normal rate, regular rhythm, normal heart sounds and intact distal pulses  Exam reveals no gallop and no friction rub  No murmur heard  Pulmonary/Chest: Effort normal and breath sounds normal  No respiratory distress  He has no wheezes  He has no rales  He exhibits no tenderness  Abdominal: Soft  Bowel sounds are normal  He exhibits no distension  There is no tenderness  There is no guarding  Musculoskeletal: Normal range of motion  He exhibits no edema, tenderness or deformity  Lymphadenopathy:     He has no cervical adenopathy  Neurological: He is alert  He displays normal reflexes  No cranial nerve deficit  Coordination normal    A&O x 2   Skin: Skin is warm and dry  He is not diaphoretic  No erythema  Psychiatric: He has a normal mood and affect  Invasive Devices          No matching active lines, drains, or airways                    Labs:   Recent Results (from the past 24 hour(s))   CBC and Platelet    Collection Time: 04/06/18  4:59 AM   Result Value Ref Range    WBC 4 85 4 31 - 10 16 Thousand/uL    RBC 4 02 3 88 - 5 62 Million/uL    Hemoglobin 11 2 (L) 12 0 - 17 0 g/dL    Hematocrit 36 6 36 5 - 49 3 %    MCV 91 82 - 98 fL    MCH 27 9 26 8 - 34 3 pg    MCHC 30 6 (L) 31 4 - 37 4 g/dL    RDW 16 3 (H) 11 6 - 15 1 %    Platelets 518 284 - 331 Thousands/uL    MPV 9 3 8 9 - 12 7 fL       Radiology Results: I have personally reviewed pertinent reports          Other Diagnostic Testing:   I have personally reviewed pertinent reports          Active Meds:   Current Facility-Administered Medications   Medication Dose Route Frequency    acetaminophen (TYLENOL) tablet 650 mg  650 mg Oral Q12H PRN    amLODIPine (NORVASC) tablet 5 mg  5 mg Oral Daily    carBAMazepine (TEGretol) tablet 200 mg  200 mg Oral BID    folic acid (FOLVITE) tablet 400 mcg  400 mcg Oral Daily    heparin (porcine) subcutaneous injection 5,000 Units  5,000 Units Subcutaneous Q8H [address]    labetalol (NORMODYNE) injection 10 mg  10 mg Intravenous Q6H PRN    LORazepam (ATIVAN) 2 mg/mL injection 1 mg  1 mg Intravenous Q4H PRN    pantoprazole (PROTONIX) EC tablet 40 mg  40 mg Oral Early Morning    thiamine (VITAMIN B1) tablet 100 mg  100 mg Oral Daily         VTE Pharmacologic Prophylaxis: Heparin  VTE Mechanical Prophylaxis: sequential compression device    Paige Stinson MD

## 2018-04-07 RX ADMIN — HEPARIN SODIUM 5000 UNITS: 5000 INJECTION, SOLUTION INTRAVENOUS; SUBCUTANEOUS at 22:04

## 2018-04-07 RX ADMIN — Medication 100 MG: at 08:26

## 2018-04-07 RX ADMIN — PANTOPRAZOLE SODIUM 40 MG: 40 TABLET, DELAYED RELEASE ORAL at 05:17

## 2018-04-07 RX ADMIN — CARBAMAZEPINE 200 MG: 200 TABLET ORAL at 18:02

## 2018-04-07 RX ADMIN — Medication 400 MCG: at 08:25

## 2018-04-07 RX ADMIN — CARBAMAZEPINE 200 MG: 200 TABLET ORAL at 08:26

## 2018-04-07 RX ADMIN — AMLODIPINE BESYLATE 5 MG: 5 TABLET ORAL at 08:26

## 2018-04-07 RX ADMIN — HEPARIN SODIUM 5000 UNITS: 5000 INJECTION, SOLUTION INTRAVENOUS; SUBCUTANEOUS at 15:29

## 2018-04-07 RX ADMIN — HEPARIN SODIUM 5000 UNITS: 5000 INJECTION, SOLUTION INTRAVENOUS; SUBCUTANEOUS at 05:17

## 2018-04-07 NOTE — PROGRESS NOTES
IM Residency Progress Note   Unit/Bed#: [facility] 920-01 Encounter: [id number]  SOD Team B       Enrico Man 43 y o  male [id number]    Hospital Stay Days: 12      Assessment/Plan:    Principal Problem:    Acute encephalopathy  Active Problems:    Hypertension    Encephalomalacia    Head trauma    Status post craniectomy    Necrotic eschar    Hypophosphatemia    Seizure ([facility] [address] )    1  Acute encephalopathy 2/2 likely seizure activity secondary to underlying encephalomalacia from TBI  · Improved encephalopathy  Does have a history of 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   Encephalomalacia seen on CT scan on 03/26  · Keppra 1 g po b i d  Switched to carbamazepine 2/2 affordability  Platelet count is stable  No need to continue to monitor platelets at this time though patient should ideally have platelets monitored in the outpatient setting periodically to assess stability  · Patient does not have any active IV lines and is a difficult stick  His medications are p o  Patient has not had any seizure activity  At this time will discontinue IV Ativan  · BP goal normotensive  Continue Norvasc 5 mg  Controlled  Will D/C IV labetalol  · PT/OT  Activity as tolerated  Fall precautions      2  Dementia with adjustment disorder  · Patient evaluated by neuropsych, deemed to have dementia, adjustment disorder, alcohol use     · Patient does not have capacity to make fully informed decisions at this time though his ability to make medical decisions on his own behalf may need to be reassessed as patient has improved from a neurological standpoint since his initial assessment - this is being looked into with case management  Limited to no close contacts/family available to reach- attempts thus far unsuccessful per CM     3  Hypertension  · Controlled  Continue p o  Norvasc  No IV access and no need for IV labetalol will d/c    4    Alcohol use  · Patient has been hospitalized since   Last drink was before hospitalization, no concern for withdrawal at this time  · Continue to monitor  · Thiamine and folic acid       Disposition:  Patient is medically stable for discharge  Given the complex discharge disposition and social concerns regarding patient's safe discharge, an escalated care meeting is planned for Monday at 9:20 a m  in the morning with legal services, case management, clinical risk management and patient's medical care team, dispo pending outcome  Subjective:   Patient seen examined this morning  No acute events overnight  Denies any fevers, chills, nausea and vomiting       Vitals: Temp (24hrs), Av 4 °F (36 9 °C), Min:98 1 °F (36 7 °C), Max:98 8 °F (37 1 °C)  Current: Temperature: 98 1 °F (36 7 °C)  Vitals:    18 1542 18 2030 18 2347 18 0815   BP: 121/73  121/81 126/79   BP Location: Right arm  Right arm Right arm   Pulse: 73  74 78   Resp:    Temp: 98 8 °F (37 1 °C)  98 2 °F (36 8 °C) 98 1 °F (36 7 °C)   TempSrc: Oral  Oral Oral   SpO2: 97% 98% 96% 99%   Weight:       Height:        Body mass index is 31 99 kg/m²  I/O last 24 hours: In:  [P O :]  Out: -       Physical Exam:     Physical Exam   Constitutional: He appears well-developed and well-nourished  HENT:   Head: Normocephalic and atraumatic  Eyes: Conjunctivae and EOM are normal  Pupils are equal, round, and reactive to light  Neck: Normal range of motion  Cardiovascular: Normal rate and regular rhythm  Exam reveals no gallop and no friction rub  No murmur heard  Pulmonary/Chest: Effort normal and breath sounds normal  No respiratory distress  He has no wheezes  Abdominal: Soft  Bowel sounds are normal  He exhibits no distension  There is no tenderness  Musculoskeletal: Normal range of motion  He exhibits no edema  Neurological: He is alert  No cranial nerve deficit  A&O x 2   Skin: Skin is warm and dry  No erythema     Psychiatric: He has a normal mood and affect  Vitals reviewed  Invasive Devices          No matching active lines, drains, or airways          Labs:   No results found for this or any previous visit (from the past 24 hour(s))  Radiology Results: I have personally reviewed pertinent reports  Other Diagnostic Testing:   I have personally reviewed pertinent reports          Active Meds:   Current Facility-Administered Medications   Medication Dose Route Frequency    acetaminophen (TYLENOL) tablet 650 mg  650 mg Oral Q12H PRN    amLODIPine (NORVASC) tablet 5 mg  5 mg Oral Daily    carBAMazepine (TEGretol) tablet 200 mg  200 mg Oral BID    folic acid (FOLVITE) tablet 400 mcg  400 mcg Oral Daily    heparin (porcine) subcutaneous injection 5,000 Units  5,000 Units Subcutaneous Q8H [facility]    labetalol (NORMODYNE) injection 10 mg  10 mg Intravenous Q6H PRN    LORazepam (ATIVAN) 2 mg/mL injection 1 mg  1 mg Intravenous Q4H PRN    pantoprazole (PROTONIX) EC tablet 40 mg  40 mg Oral Early Morning    thiamine (VITAMIN B1) tablet 100 mg  100 mg Oral Daily         VTE Pharmacologic Prophylaxis: Heparin  VTE Mechanical Prophylaxis: sequential compression device    Nilson Villatoro MD

## 2018-04-08 RX ADMIN — CARBAMAZEPINE 200 MG: 200 TABLET ORAL at 08:41

## 2018-04-08 RX ADMIN — HEPARIN SODIUM 5000 UNITS: 5000 INJECTION, SOLUTION INTRAVENOUS; SUBCUTANEOUS at 14:24

## 2018-04-08 RX ADMIN — AMLODIPINE BESYLATE 5 MG: 5 TABLET ORAL at 08:41

## 2018-04-08 RX ADMIN — PANTOPRAZOLE SODIUM 40 MG: 40 TABLET, DELAYED RELEASE ORAL at 05:36

## 2018-04-08 RX ADMIN — HEPARIN SODIUM 5000 UNITS: 5000 INJECTION, SOLUTION INTRAVENOUS; SUBCUTANEOUS at 05:36

## 2018-04-08 RX ADMIN — HEPARIN SODIUM 5000 UNITS: 5000 INJECTION, SOLUTION INTRAVENOUS; SUBCUTANEOUS at 22:04

## 2018-04-08 RX ADMIN — Medication 400 MCG: at 08:41

## 2018-04-08 RX ADMIN — CARBAMAZEPINE 200 MG: 200 TABLET ORAL at 17:36

## 2018-04-08 RX ADMIN — Medication 100 MG: at 08:41

## 2018-04-08 NOTE — PROGRESS NOTES
IM Residency Progress Note   Unit/Bed#: [facility] 920-01 Encounter: [id number]  SOD Team B       Yue Feldman 43 y o  male [id number]    Hospital Stay Days: 13      Assessment/Plan:    Principal Problem:    Acute encephalopathy  Active Problems:    Hypertension    Encephalomalacia    Head trauma    Status post craniectomy    Necrotic eschar    Hypophosphatemia    Seizure ([address] )    1  Acute encephalopathy 2/2 likely seizure activity secondary to underlying encephalomalacia from TBI  · Improved encephalopathy  Does have a history of 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   Encephalomalacia seen on CT scan on 03/26  · Keppra 1 g po b i d  Switched to carbamazepine 2/2 affordability  Platelet count is stable  No need to continue to monitor platelets at this time though patient should ideally have platelets monitored in the outpatient setting periodically to assess stability  · Patient does not have any active IV lines and is a difficult stick  His medications are p o  Kimi Kelly · BP goal normotensive  Continue Norvasc 5 mg  Controlled  · PT/OT  Activity as tolerated  Fall precautions       2  Dementia with adjustment disorder  · Patient evaluated by neuropsych, deemed to have dementia, adjustment disorder, alcohol use     · Patient does not have capacity to make fully informed decisions at this time though his ability to make medical decisions on his own behalf may need to be reassessed as patient has improved from a neurological standpoint since his initial assessment - this is being looked into with case management  Limited to no close contacts/family available to reach- attempts thus far unsuccessful per CM     3  Hypertension  Controlled  Continue p o  Norvasc       4  Alcohol use  · Patient has been hospitalized since March 26    Last drink was before hospitalization, no concern for withdrawal at this time  · Continue to monitor  · Thiamine and folic acid    Disposition: Pt medically stable for discharge  Will have escalated care meeting tomorrow at 9:20 a m  in the morning with legal services, case management, clinical risk management and medical care team      Subjective:   Patient seen examined  No acute overnight events  No complaints at this time         Vitals: Temp (24hrs), Av 6 °F (37 °C), Min:98 1 °F (36 7 °C), Max:99 1 °F (37 3 °C)  Current: Temperature: 98 5 °F (36 9 °C)  Vitals:    18 0844 18 1524 18 1930 18 0019   BP:  124/84  113/73   BP Location:  Right arm  Right arm   Pulse:  74  66   Resp:  18  18   Temp:  99 1 °F (37 3 °C)  98 5 °F (36 9 °C)   TempSrc:  Oral  Oral   SpO2: 99% 100% 100% 100%   Weight:       Height:        Body mass index is 31 99 kg/m²  I/O last 24 hours: In: 688 [P  O :688]  Out: -       Physical Exam:     Physical Exam   Constitutional: He appears well-developed and well-nourished  No distress  HENT:   Head: Normocephalic and atraumatic  Nose: Nose normal    Mouth/Throat: No oropharyngeal exudate  Eyes: Conjunctivae and EOM are normal  Pupils are equal, round, and reactive to light  No scleral icterus  Neck: Normal range of motion  No JVD present  Cardiovascular: Normal rate, regular rhythm, normal heart sounds and intact distal pulses  Exam reveals no gallop and no friction rub  No murmur heard  Pulmonary/Chest: Effort normal and breath sounds normal  No respiratory distress  He has no wheezes  He has no rales  He exhibits no tenderness  Abdominal: Soft  Bowel sounds are normal  He exhibits no distension  There is no tenderness  There is no guarding  Musculoskeletal: Normal range of motion  He exhibits no edema, tenderness or deformity  Lymphadenopathy:     He has no cervical adenopathy  Neurological: He displays normal reflexes  No cranial nerve deficit  Coordination normal    A&O x 2   Skin: Skin is warm and dry  He is not diaphoretic  No erythema     Psychiatric: He has a normal mood and affect  Invasive Devices          No matching active lines, drains, or airways                    Labs: No results found for this or any previous visit (from the past 24 hour(s))  Radiology Results: I have personally reviewed pertinent reports  Other Diagnostic Testing:   I have personally reviewed pertinent reports          Active Meds:   Current Facility-Administered Medications   Medication Dose Route Frequency    acetaminophen (TYLENOL) tablet 650 mg  650 mg Oral Q12H PRN    amLODIPine (NORVASC) tablet 5 mg  5 mg Oral Daily    carBAMazepine (TEGretol) tablet 200 mg  200 mg Oral BID    folic acid (FOLVITE) tablet 400 mcg  400 mcg Oral Daily    heparin (porcine) subcutaneous injection 5,000 Units  5,000 Units Subcutaneous Q8H [address]    pantoprazole (PROTONIX) EC tablet 40 mg  40 mg Oral Early Morning    thiamine (VITAMIN B1) tablet 100 mg  100 mg Oral Daily         VTE Pharmacologic Prophylaxis: Heparin  VTE Mechanical Prophylaxis: sequential compression device    Gasper Kahn MD

## 2018-04-09 RX ADMIN — Medication 400 MCG: at 12:31

## 2018-04-09 RX ADMIN — CARBAMAZEPINE 200 MG: 200 TABLET ORAL at 08:51

## 2018-04-09 RX ADMIN — HEPARIN SODIUM 5000 UNITS: 5000 INJECTION, SOLUTION INTRAVENOUS; SUBCUTANEOUS at 21:50

## 2018-04-09 RX ADMIN — Medication 100 MG: at 08:50

## 2018-04-09 RX ADMIN — HEPARIN SODIUM 5000 UNITS: 5000 INJECTION, SOLUTION INTRAVENOUS; SUBCUTANEOUS at 14:18

## 2018-04-09 RX ADMIN — HEPARIN SODIUM 5000 UNITS: 5000 INJECTION, SOLUTION INTRAVENOUS; SUBCUTANEOUS at 05:23

## 2018-04-09 RX ADMIN — AMLODIPINE BESYLATE 5 MG: 5 TABLET ORAL at 08:50

## 2018-04-09 RX ADMIN — PANTOPRAZOLE SODIUM 40 MG: 40 TABLET, DELAYED RELEASE ORAL at 05:23

## 2018-04-09 RX ADMIN — CARBAMAZEPINE 200 MG: 200 TABLET ORAL at 17:39

## 2018-04-09 NOTE — CASE MANAGEMENT
Continued Stay Review    Date: 04/07/18    Vital Signs: /83 (BP Location: Right arm)   Pulse 75   Temp 98 4 °F (36 9 °C) (Oral)   Resp 18   Ht 5' (1 524 m)   Wt 74 3 kg (163 lb 12 8 oz)   SpO2 100%   BMI 31 99 kg/m²     Medications:   Scheduled Meds:   Current Facility-Administered Medications:  acetaminophen 650 mg Oral Q12H PRN   amLODIPine 5 mg Oral Daily   carBAMazepine 200 mg Oral BID   folic acid 838 mcg Oral Daily   heparin (porcine) 5,000 Units Subcutaneous Q8H [facility] & long term   pantoprazole 40 mg Oral Early Morning   thiamine 100 mg Oral Daily     Continuous Infusions:    PRN Meds:   acetaminophen    Age/Sex: 43 y o  male     Assessment/Plan:     Principal Problem:    Acute encephalopathy  Active Problems:    Hypertension    Encephalomalacia    Head trauma    Status post craniectomy    Necrotic eschar    Hypophosphatemia    Seizure (HCC)     1  Acute encephalopathy 2/2 likely seizure activity secondary to underlying encephalomalacia from TBI  · Improved encephalopathy  Does have a history of 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   Encephalomalacia seen on CT scan on 03/26  · Keppra 1 g po b i d  Switched to carbamazepine 2/2 affordability  Platelet count is stable  No need to continue to monitor platelets at this time though patient should ideally have platelets monitored in the outpatient setting periodically to assess stability  · Patient does not have any active IV lines and is a difficult stick  His medications are p o  Patient has not had any seizure activity  At this time will discontinue IV Ativan  · BP goal normotensive  Continue Norvasc 5 mg  Controlled  Will D/C IV labetalol  · PT/OT  Activity as tolerated  Fall precautions       2    Dementia with adjustment disorder  · Patient evaluated by neuropsych, deemed to have dementia, adjustment disorder, alcohol use     · Patient does not have capacity to make fully informed decisions at this time though his ability to make medical decisions on his own behalf may need to be reassessed as patient has improved from a neurological standpoint since his initial assessment - this is being looked into with case management  Limited to no close contacts/family available to reach- attempts thus far unsuccessful per CM     3  Hypertension  · Controlled  Continue p o  Norvasc  No IV access and no need for IV labetalol will d/c     4  Alcohol use  · Patient has been hospitalized since March 26  Last drink was before hospitalization, no concern for withdrawal at this time  · Continue to monitor  · Thiamine and folic acid     Disposition:  Patient is medically stable for discharge  Given the complex discharge disposition and social concerns regarding patient's safe discharge, an escalated care meeting is planned for Monday at 9:20 a m  in the morning with legal services, case management, clinical risk management and patient's medical care team, dispo pending outcome  Thank you,  [facility] by [employer] for 2017  Network Utilization Review Department  Phone: [phone]; Fax [phone]  ATTENTION: The Network Utilization Review Department is now centralized for our 7 Facilities  Make a note that we have a new phone and fax numbers for our Department  Please call with any questions or concerns to [phone] and carefully follow the prompts so that you are directed to the right person  All voicemails are confidential  Fax any determinations, approvals, denials, and requests for initial or continue stay review clinical to [phone]  Due to HIGH CALL volume, it would be easier if you could please send faxed requests to expedite your requests and in part, help us provide discharge notifications faster

## 2018-04-09 NOTE — PLAN OF CARE
DISCHARGE PLANNING     Discharge to home or other facility with appropriate resources Progressing        INFECTION - ADULT     Absence or prevention of progression during hospitalization Progressing     Absence of fever/infection during neutropenic period Progressing        Knowledge Deficit     Patient/family/caregiver demonstrates understanding of disease process, treatment plan, medications, and discharge instructions Progressing        NEUROSENSORY - ADULT     Absence of seizures Progressing        Nutrition/Hydration-ADULT     Nutrient/Hydration intake appropriate for improving, restoring or maintaining nutritional needs Progressing        PAIN - ADULT     Verbalizes/displays adequate comfort level or baseline comfort level Progressing        Potential for Falls     Patient will remain free of falls Progressing        Prexisting or High Potential for Compromised Skin Integrity     Skin integrity is maintained or improved Progressing        SAFETY ADULT     Maintain or return to baseline ADL function Progressing     Maintain or return mobility status to optimal level Progressing     Patient will remain free of falls Progressing

## 2018-04-09 NOTE — PROGRESS NOTES
IM Residency Progress Note   Unit/Bed#: [facility] 920-01 Encounter: [id number]  SOD Team B       Kim Divers 43 y o  male [address] Stay Days: 14      Assessment/Plan:    Principal Problem:    Acute encephalopathy  Active Problems:    Hypertension    Encephalomalacia    Head trauma    Status post craniectomy    Necrotic eschar    Hypophosphatemia    Seizure (HCC)    1   Acute encephalopathy 2/2 likely seizure activity secondary to underlying encephalomalacia from TBI  · Improved encephalopathy  Does have a history of 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   Encephalomalacia seen on CT scan on 03/26  · Keppra 1 g po b i d  Switched to carbamazepine 2/2 affordability   Platelet count is stable   No need to continue to monitor platelets at this time though patient should ideally have platelets monitored in the outpatient setting periodically to assess stability  · Patient does not have any active IV lines and is a difficult stick   His medications are p o       · BP goal normotensive   Continue Norvasc 5 mg   Controlled  · PT/OT  Activity as tolerated   Fall precautions       2   Dementia with adjustment disorder  · Patient evaluated by neuropsych, deemed to have dementia, adjustment disorder, alcohol use     · Patient does not have capacity to make fully informed decisions at this time though his ability to make medical decisions on his own behalf may need to be reassessed as patient has improved from a neurological standpoint since his initial assessment - this is being looked into with case management   Limited to no close contacts/family available to reach- attempts thus far unsuccessful per CM     3   Hypertension  Controlled   Continue p o   Norvasc        4   Alcohol use  · Patient has been hospitalized since March 26   Last drink was before hospitalization, no concern for withdrawal at this time  · Continue to monitor  · Thiamine and folic acid     Disposition: Pt medically stable for discharge  Will have escalated care meeting today at 9:20 a m  in the morning with legal services, case management, clinical risk management and medical care team           Subjective:   Patient seen examined  No acute overnight events  No acute complaints at this time       Vitals: Temp (24hrs), Av 2 °F (36 8 °C), Min:97 8 °F (36 6 °C), Max:98 8 °F (37 1 °C)  Current: Temperature: 98 °F (36 7 °C)  Vitals:    18 0820 18 1553 18 2037 18 2339   BP:  113/71  114/85   BP Location:  Right arm  Right arm   Pulse:  75  (!) 5   Resp:  17  18   Temp:  98 8 °F (37 1 °C)  98 °F (36 7 °C)   TempSrc:  Oral  Oral   SpO2: 99%  99% 100%   Weight:       Height:        Body mass index is 31 99 kg/m²  I/O last 24 hours: In: 80 [P O :580]  Out: -       Physical Exam:     Physical Exam   Constitutional: He is oriented to person, place, and time  He appears well-developed and well-nourished  No distress  HENT:   Head: Normocephalic and atraumatic  Nose: Nose normal    Mouth/Throat: No oropharyngeal exudate  Eyes: Conjunctivae and EOM are normal  Pupils are equal, round, and reactive to light  No scleral icterus  Neck: Normal range of motion  No JVD present  Cardiovascular: Normal rate, regular rhythm, normal heart sounds and intact distal pulses  Exam reveals no gallop and no friction rub  No murmur heard  Pulmonary/Chest: Effort normal and breath sounds normal  No respiratory distress  He has no wheezes  He has no rales  He exhibits no tenderness  Abdominal: Soft  Bowel sounds are normal  He exhibits no distension  There is no tenderness  There is no guarding  Musculoskeletal: Normal range of motion  He exhibits no edema, tenderness or deformity  Lymphadenopathy:     He has no cervical adenopathy  Neurological: He is alert and oriented to person, place, and time  He displays normal reflexes  No cranial nerve deficit   Coordination normal    Skin: Skin is warm and dry  He is not diaphoretic  No erythema  Psychiatric: He has a normal mood and affect  Invasive Devices          No matching active lines, drains, or airways                    Labs: No results found for this or any previous visit (from the past 24 hour(s))  Radiology Results: I have personally reviewed pertinent reports  Other Diagnostic Testing:   I have personally reviewed pertinent reports          Active Meds:   Current Facility-Administered Medications   Medication Dose Route Frequency    acetaminophen (TYLENOL) tablet 650 mg  650 mg Oral Q12H PRN    amLODIPine (NORVASC) tablet 5 mg  5 mg Oral Daily    carBAMazepine (TEGretol) tablet 200 mg  200 mg Oral BID    folic acid (FOLVITE) tablet 400 mcg  400 mcg Oral Daily    heparin (porcine) subcutaneous injection 5,000 Units  5,000 Units Subcutaneous Q8H [address]    pantoprazole (PROTONIX) EC tablet 40 mg  40 mg Oral Early Morning    thiamine (VITAMIN B1) tablet 100 mg  100 mg Oral Daily         VTE Pharmacologic Prophylaxis: Heparin  VTE Mechanical Prophylaxis: sequential compression device    Gasper Kahn MD

## 2018-04-09 NOTE — SOCIAL WORK
Escalated Team Meeting held with the following individuals present: Chapo Feldman (CM), Brenden Rueda (CSWS), Dr Emilie Gr (SOD), Dr Staci Raymond (SOD), Dr Mireya Burger (SOD), Jennifer Springer (clinical risk management) and Riki Samuel (legal services)  Patient is medically stable for discharge  Discussed current barriers to discharge  Patient had a capacity evaluation completed on 3/30/18 which determined patient did not have capacity to make medical and self care decisions  Patient's cognition has improved and now SOD plans to order a psychiatric consult to evaluate his capacity  Legal services explained Act 169 and that patient must be able to make, understand and communicate healthcare decisions  Discussed other barriers including: patient is not a citizen, has reported moving to [address] [facility] from [facility] 5-6 years ago  This means that patient will not be eligible for health insurance or other government assistance  Patient's family was unable to be located despite numerous efforts from  on multiple occassions  Psychiatric consult pending  If patient does not have capacity guardianship process will be started however placement options will be extremely limited as patient does not have any income, was homeless prior to admission and is not eligible for financial assistance

## 2018-04-10 PROBLEM — G93.40 ACUTE ENCEPHALOPATHY: Status: RESOLVED | Noted: 2018-03-26 | Resolved: 2018-04-10

## 2018-04-10 PROBLEM — E83.39 HYPOPHOSPHATEMIA: Status: RESOLVED | Noted: 2018-03-28 | Resolved: 2018-04-10

## 2018-04-10 PROCEDURE — 99254 IP/OBS CNSLTJ NEW/EST MOD 60: CPT | Performed by: PSYCHIATRY & NEUROLOGY

## 2018-04-10 RX ORDER — AMLODIPINE BESYLATE 5 MG/1
5 TABLET ORAL DAILY
Qty: 30 TABLET | Refills: 0 | Status: SHIPPED | OUTPATIENT
Start: 2018-04-10

## 2018-04-10 RX ORDER — CARBAMAZEPINE 200 MG/1
200 TABLET ORAL 2 TIMES DAILY
Qty: 60 TABLET | Refills: 0 | Status: SHIPPED | OUTPATIENT
Start: 2018-04-10

## 2018-04-10 RX ADMIN — HEPARIN SODIUM 5000 UNITS: 5000 INJECTION, SOLUTION INTRAVENOUS; SUBCUTANEOUS at 13:27

## 2018-04-10 RX ADMIN — HEPARIN SODIUM 5000 UNITS: 5000 INJECTION, SOLUTION INTRAVENOUS; SUBCUTANEOUS at 22:52

## 2018-04-10 RX ADMIN — Medication 400 MCG: at 11:57

## 2018-04-10 RX ADMIN — AMLODIPINE BESYLATE 5 MG: 5 TABLET ORAL at 08:20

## 2018-04-10 RX ADMIN — PANTOPRAZOLE SODIUM 40 MG: 40 TABLET, DELAYED RELEASE ORAL at 05:16

## 2018-04-10 RX ADMIN — Medication 100 MG: at 08:20

## 2018-04-10 RX ADMIN — CARBAMAZEPINE 200 MG: 200 TABLET ORAL at 08:20

## 2018-04-10 RX ADMIN — HEPARIN SODIUM 5000 UNITS: 5000 INJECTION, SOLUTION INTRAVENOUS; SUBCUTANEOUS at 05:16

## 2018-04-10 RX ADMIN — CARBAMAZEPINE 200 MG: 200 TABLET ORAL at 17:42

## 2018-04-10 NOTE — DISCHARGE INSTRUCTIONS
Encefalopatía traumática crónica   LO QUE NECESITA SABER:   La encefalopatía traumática crónica es daño cerebral permanente causado por lesiones cerebrales traumáticas repetidas, cuate conmociones  Encefalopatía es un término que se [place] para describir belen enfermedad del cerebro o un daño cerebral  Belen conmoción es belen lesión que causa que el cerebro golpee el cráneo  Después de varias conmociones ciertas proteínas podrían empezar a reemplazar el tejido cerebral jimmy  Las proteínas evitan que el cerebro funcione correctamente  INSTRUCCIONES SOBRE [facility]ITAL[facility]:   Llame al 911 o pídale a alguien más que llame en cualquiera de los siguientes casos:   · [place] lastimarse o lastimar a otra persona  · No es posible despertarlo  · Usted sufre belen convulsión  Regrese a la antonio de emergencias si:   · Usted sufre otra lesión en la mayelin  Pregúntele a castillo [place] vitaminas y minerales son adecuados para usted  · Usted tiene nuevos síntomas o alexsandra síntomas empeoran  · Usted tiene dificultad para dormir o hacer alexsandra actividades diarias  · Usted tiene preguntas o inquietudes acerca de castillo condición o cuidado  Controle la encefalopatía traumática crónica:  La encefalopatía traumática crónica no puede curarse, kyara los síntomas sí se pueden controlar:  · Establezca rutinas  Prepare un horario para alexsandra actividades diarias  Alexsandra síntomas son generalmente peores por las noches  Acuéstese a dormir y levántese por la mañana a la misma hora  Hable con castillo médico si tiene dificultad para dormir o permanecer dormido  · Vaya a terapia física, ocupacional o emocional según indicaciones dadas  Un terapeuta del comportamiento cognitivo le enseñará destrezas para ayudar con alexsandra problemas de pensamiento y [place]  Un terapeuta físico puede ayudarle con el equilibrio, coordinación y control muscular  Un terapeuta ocupacional puede ayudarle a aprender formas de hacer alexsandra actividades diarias   Por ejemplo, usted podría crear un calendario con florina actividades diarias  La terapia emocional incluye hablar con un terapeuta solo o con familiares sobre florina síntomas  · Pida apoyo y [place]  Explique florina síntomas a otras personas  Pídales que jenyn pacientes y [facility] repitan información si es necesario  Un ambiente tranquilo y de bienestar puede ayudar a prevenir ataques de kahlil u otros problemas de comportamiento  · Ejercítese según indicaciones  El ejercicio puede ayudar a prevenir cambios de humor  También puede ayudar con la coordinación muscular y habilidades motoras  Es probable que usted también duerma más fácilmente cuando practica ejercicios regularmente  Consulte con castillo médico acerca de los ejercicios adecuados para usted  Belen bicicleta estacionaria o ejercicios que se hacen en belen silla podrían ser más seguros si usted tiene dificultad para caminar  · Usted debe seguir usando castillo habilidad para pensar activamente  Alanna actividades que lo stephenie pensar  Jasiel Leather son rompecabezas, aprender a leer música y resolver crucigramas  · Consuma alimentos saludables y variados  Los alimentos saludables incluyen frutas, verduras, pan integral, productos lácteos bajos en grasa, frijoles, arash magras y pescado  Los alimentos saludables pueden ayudar con la steffany de castillo cerebro  · Limite o no consuma bebidas alcohólicas, según indicaciones  El alcohol puede empeorar florina síntomas y causarle más daño cerebral  Pregunte a castillo médico si usted puede vimal alcohol  · No fume  La nicotina y otros químicos de los cigarrillos y los cigarros pueden causar daños en los vasos sanguíneos y el cerebro  Pida información a castillo médico si usted actualmente fuma y necesita ayuda para dejar de fumar  Los cigarrillos electrónicos o tabaco sin humo todavía contienen nicotina  Consulte con castillo médico antes de [employer]    Para más información:   · [employer]  [address] [place], [place] [address]  Phone: [address]  Phone: [phone]  Web Address: PokerProtocol cz  org  Acuda a florina consultas de control con guerra médico según le indicaron  Anote florina preguntas para que se acuerde de hacerlas lorri florina visitas  © 2017 [address] Information is for End User's use only and may not be sold, redistributed or otherwise used for commercial purposes  All illustrations and images included in CareNotes® are the copyrighted property of A D A M  Inc  or Jeison San  Esta información es sólo para uso en educación  Guerra intención no es darle un consejo médico sobre enfermedades o tratamientos  Colsulte con guerra [place] farmacéutico antes de seguir cualquier régimen médico para saber si es seguro y efectivo para usted  Epilepsia   LO QUE NECESITA SABER:   La epilepsia es un trastorno cerebral que provoca convulsiones  También se conoce cuate trastorno convulsivo  Belen convulsión significa que un área anormal en el cerebro a veces envía ráfagas de actividad eléctrica  Belen convulsión puede comenzar en belen parte de guerra cerebro, o ambos lados pueden verse afectados  Dependiendo del tipo de convulsión, puede tener movimientos que no puede controlar, perder la conciencia o mirar fijo hacia adelante  Puede sentirse confundido o cansado después de la convulsión  Belen convulsión puede durar unos segundos o más de 5 minutos  Un defecto de nacimiento, un tumor, un derrame cerebral, demencia, belen lesión o belen infección podrían provocar belen epilepsia  Podría desconocerse la causa de guerra epilepsia  Si florina convulsiones no son controladas, podrían representar belen amenaza para la nikolai  INSTRUCCIONES SOBRE EL [facility]ATH[facility]OSPITALARIA:   Llame al 911 en trista de presentar lo siguiente:   · Guerra convulsión dura más de 5 minutos  · Tiene dificultad para respirar después de belen convulsión  · Tiene diabetes o Shalini Callas y tiene belen convulsión      · Tiene belen convulsión en el [place] belen piscina o bañera  Busque atención médica de inmediato si:   · Tiene belen segunda convulsión dentro de las primeras 24 horas  · Usted sufre belen lesión lorri belen convulsión  Pregúntele a castillo Rosita Aneudy vitaminas y minerales son adecuados para usted  · Usted siente que no puede lidiar con castillo condición  · Alexsandra convulsiones comienzan a suceder con mayor frecuencia  · Se siente confundido por más tiempo de lo usual después de belen convulsión  · Usted está planeando quedar embarazada o está embarazada actualmente  · Usted tiene preguntas o inquietudes acerca de castillo condición o cuidado  Medicamentos:   · Los medicamentos anticonvulsivos  podrían controlar o evitar otra convulsión  No suspenda el uso de [employer]  Es posible que otra persona deba administrarle un medicamento de alivio rápido si usted tiene convulsiones en el hogar  Pida más información a castillo médico sobre los medicamentos de [place] rápido  · [place] alexsandra medicamentos cuate se le haya indicado  Consulte con castillo médico si usted leigh que castillo medicamento no le está ayudando o si presenta efectos secundarios  Infórmele si es alérgico a algún medicamento  Mantenga belen lista actualizada de los [employer], las vitaminas y los productos herbales que mariama  Incluya los siguientes datos de los medicamentos: cantidad, frecuencia y motivo de administración  Traiga con usted la lista o los envases de la píldoras a alexsandra citas de seguimiento  Lleve la lista de los medicamentos con usted en trista de belen emergencia  Programe belen bala con castillo neurólogo cuate se le indique:  Usted podría necesitar exámenes para revisar el nivel de medicamento contra las convulsiones en castillo [place]  Castillo neurólogo podría necesitar cambiar o ajustar castillo medicamento  Anote alexsandra preguntas para que se acuerde de hacerlas lorri alexsandra visitas  Qué puede hacer para prevenir belen convulsión:  Lo más probable es que usted no pueda prevenir todas las convulsiones   Lo siguiente puede ayudarle a controlar los desencadenantes que pueden iniciar de belen convulsión:  · [place] castillo medicamento todos los días a la misma hora  [place] también ayudará a prevenir los efectos secundarios del medicamento  Programe belen alarma para que le ayude a acordarse de vimal castillo medicamento cada día  · Controle el estrés  El estrés puede ser un desencadenante de la epilepsia  El ejercicio puede ayudar a reducir el estrés  Consulte con castillo médico acerca de cuál es el mejor régimen de ejercicios para usted  Belen enfermedad puede ser belen forma de estrés  Consuma belen variedad de alimentos saludables y tome suficientes líquidos lorri belen enfermedad  Hable con castillo médico acerca de otras formas de controlar el estrés  · Establezca un horario y [facility] [place] para ir a dormir  La falta de sueño puede desencadenar belen convulsión  Trate de acostarse y levantarse a la misma hora todos los días  Mantenga castillo habitación sin ruidos y [place]  Consulte con castillo médico si usted tiene dificultad para dormir  · Limite o no consuma bebidas alcohólicas, según indicaciones  El alcohol puede desencadenar belen convulsión, especialmente si sharon belen gran cantidad de [place]  Belen bebida de alcohol equivale a 12 onzas de cerveza, 1½ onzas de licor o 5 onzas de vino  Hable con castillo médico acerca de belen cantidad nogueira de alcohol para usted  El médico puede recomendarle que no sanya alcohol  Dígale si necesita ayuda para dejar de beber  Lo que puede hacer para controlar la epilepsia:   · Mantenga un diario de florina convulsiones  [place] puede ayudarle a determinar los factores desencadenantes y evitarlos  Rachel las fechas de florina SIX-FOURS-LES-PLAGES, dónde se [place] y qué estaba haciendo  Incluya cómo se sintió antes y después  Los posibles factores desencadenantes incluyen enfermedades, falta de sueño, cambios hormonales, alcohol, drogas, luces o estrés  · Registre cualquier aura que tenga antes de belen convulsión    Un aura es belen señal de que está a punto de [employer] convulsión  Los auras suceden antes de ciertos tipos de convulsiones que se producen en belen fani parte del cerebro  El aura puede ocurrir segundos antes, o hasta belen hora antes de belen convulsión  Puede sentir, shruthi, escuchar u oler algo  Los ejemplos incluyen que belen parte de castillo cuerpo se calienta  Puede shruthi un destello de zaid o escuchar algo  Puede experimentar ansiedad o déjà vu  Si tiene un aura, inclúyalo en castillo diario de convulsiones  · [place] un plan de cuidado personal   Informe a florina familiares, amigos y compañeros de trabajo acerca de castillo epilepsia  Deles instrucciones que expliquen cuate ellos pueden mantenerlo seguro si usted tiene belen convulsión  · Busque apoyo  A usted podrían referirlo a un psicólogo o a un trabajador social  [place] a castillo médico acerca de grupos de [de-identified] para personas con epilepsia  · Pregunte qué precauciones de seguridad usted debería vimal  Consulte con castillo médico sobre la posibilidad de conducir  Es posible que no sea capaz de [employer] se howie de la convulsión lorri un período de [place]  Necesitará comprobar la ricardo de donde usted vive  También debe hablar con castillo proveedor de atención médica acerca de nadar y bañarse  Puede ahogarse o desarrollar daño cardíaco o pulmonar potencialmente mortal si tiene belen convulsión en el agua  · Lleve belen identificación de [employer]  Use un brazalete o belen manning de alerta médica o belen tarjeta que indique que tiene epilepsia  Pregúntele a castillo médico dónde conseguir estos artículos  Cómo pueden otras personas mantenerlo seguro lorri belen convulsión:  Dé las siguientes instrucciones a florina familiares, amigos y compañeros de trabajo:  · No entre en pánico     · No me sujeten ni me pongan ningún objeto en la boca  · Guíenme cuidadosamente al suelo o a belen superficie suave  · Acuéstenme de lado para que no trague mi propia saliva o vómito  · Protéjanme de lesiones   Donnel Kays cortantes o duros de mis proximidades o protejan mi mayelin  · Aflojen mi ropa en el área alrededor de la mayelin y erna  · Tomen el tiempo que sanchez mis convulsiones  Llame al 911 si mis convulsiones sanchez más de 5 minutos o si tengo belen segunda convulsión  · Permanezcan conmigo hasta que termine la convulsión  Déjenme descansar hasta que esté completamente despierto  · Realicen reanimación cardiopulmonar si dejo de respirar o si no pueden sentir mi pulso  · No me den nada de comer ni de vimal hasta que esté completamente despierto  © 2017 [address] [employer] Information is for End User's use only and may not be sold, redistributed or otherwise used for commercial purposes  All illustrations and images included in CareNotes® are the copyrighted property of A D A M , Inc  or Jeison San  Esta información es sólo para uso en educación  Guerra intención no es darle un consejo médico sobre enfermedades o tratamientos  Colsulte con guerra Shlomo Lindsay farmacéutico antes de seguir cualquier régimen médico para saber si es seguro y efectivo para usted

## 2018-04-10 NOTE — CONSULTS
Consultation - [id number] [facility] [address] 43 y o  male MRN: [id number]  Unit/Bed#: [facility] 920-01 Encounter: [id number]      Chief Complaint:  I am feeling better    History of Present Illness   Physician Requesting Consult: Jessica Lara MD  Reason for Consult / Principal Problem:  Decision-making capacity    Jerrod Oneal is a 43 y o  male presents with seizure-like activity, acute respiratory failure and head trauma on 3/26 /2018  Patient had been in the hospital for the last 15 days because he was found not to have decision-making capacity  Patient is Guatemalan-speaking only and evaluate him on [place]  He states that he came [facility]  With some friends  He does not recall what happened  But states that he has seizure history in the past  He was taking one  Medication for that in [place]  He has a long standing history of alcohol use  He has a history of withdrawals  He states that he knows that he is in the hospital but he does not know the name,  The primary doctor told him he needs to take the medication twice a day  He states that he has medical insurance from [place]  He plans to go to the doctor, when he arrives there, top continue with the medications to prevent any other episodes of seizures  Patient is alert and oriented x 3  He denies symptoms of depression  Denies any psychotic symptoms  Or History of selin  Psychiatric Review Of Systems:  sleep: no  appetite changes: no  weight changes: no  energy/anergy: no  interest/pleasure/anhedonia: no  somatic symptoms: no  anxiety/panic: no  selin: no  guilty/hopeless: no  self injurious behavior/risky behavior: no    Historical Information   Past Psychiatric History:   None  Currently in treatment with none    Past Suicide attempts:  None  Past Violent behavior:  None  Past Psychiatric medication trial:  None    Substance Abuse History:  He has a longstanding history of alcohol use, he has a history of seizure withdrawal , denies any history of drugs     I have assessed this patient for substance use within the past 12 months     History of IP/OP rehabilitation program:  None  Smoking history:  None  Family Psychiatric History:   Denies    Social History  Education: Seventh grade  Learning Disabilities: None  Marital history: single  Living arrangement, social support: He lives with friends and his brother-in-law all  Occupational History: unknown occupation  Functioning Relationships: good support system    Other Pertinent History: None    Traumatic History:   Abuse: None  Other Traumatic Events: None    Past Medical History:   Diagnosis Date    Encephalomalacia     Head trauma     Homelessness     Hypertension     Status post craniectomy        Medical Review Of Systems:  Review of Systems - all systems reviewed were negative    Meds/Allergies   current meds:   Current Facility-Administered Medications   Medication Dose Route Frequency    acetaminophen (TYLENOL) tablet 650 mg  650 mg Oral Q12H PRN    amLODIPine (NORVASC) tablet 5 mg  5 mg Oral Daily    carBAMazepine (TEGretol) tablet 200 mg  200 mg Oral BID    folic acid (FOLVITE) tablet 400 mcg  400 mcg Oral Daily    heparin (porcine) subcutaneous injection 5,000 Units  5,000 Units Subcutaneous Q8H [facility]    pantoprazole (PROTONIX) EC tablet 40 mg  40 mg Oral Early Morning    thiamine (VITAMIN B1) tablet 100 mg  100 mg Oral Daily     No Known Allergies    Objective   Vital signs in last 24 hours:  Temp:  [98 2 °F (36 8 °C)-98 3 °F (36 8 °C)] 98 3 °F (36 8 °C)  HR:  [66-77] 68  Resp:  [16-18] 16  BP: ()/(60-77) 120/77      Intake/Output Summary (Last 24 hours) at 04/10/18 1207  Last data filed at 04/10/18 0900   Gross per 24 hour   Intake             1800 ml   Output                0 ml   Net             1800 ml       Mental Status Evaluation:  Appearance:  age appropriate   Behavior:  cooperative   Speech:  normal pitch, normal volume and in Croatian   Mood:  euthymic   Affect: mood-congruent   Language: naming objects and repeating phrases   Thought Process:  normal   Associations: intact associations   Thought Content:  normal   Perceptual Disturbances: None   Risk Potential: He denies any suicidal thought plans or intent   Sensorium:  person, place and time/date   Memory:  recent and remote memory grossly intact   Cognition:  grossly intact   Consciousness:  alert and awake    Attention: attention span and concentration were age appropriate   Intellect: normal   Fund of Knowledge: awareness of current events: fair   Insight:  good   Judgment: good   Muscle Strength and Tone: Within normal limits   Gait/Station: normal gait/station   Motor Activity: no abnormal movements     Lab Results:  Lab Results   Component Value Date    WBC 4 85 04/06/2018    HGB 11 2 (L) 04/06/2018    HCT 36 6 04/06/2018    MCV 91 04/06/2018     04/06/2018     Lab Results   Component Value Date    GLUCOSE 98 04/02/2018    CALCIUM 9 2 04/02/2018     04/02/2018    K 4 0 04/02/2018    CO2 23 04/02/2018     04/02/2018    BUN 17 04/02/2018    CREATININE 0 76 04/02/2018           Code Status: )Level 1 - Full Code    Assessment/Plan     Assessment:  Yue Feldman is a 43 y o  male who was admitted with a episode of seizures and head trauma patient has been  decleared not having decision-making capacity and today after re-evaluation patient understand his medical conditions, he understand that he need to take medication to prevent seizures, he also understand that he should not drink again  He know where he lived in [place], he remember how he came to [address] [place] [address], he has memory have improved   At this moment patient had decision-making capacity to make decisions about his medical treatment and discharge planning  Diagnosis:  Alcohol abuse uncomplicated  Plan:   Continue medical management  No other intervention at this moment  Discussed with the primary team  Risks, benefits and possible side effects of Medications:   No medication given      Tammi Mendoza MD

## 2018-04-10 NOTE — SOCIAL WORK
Cm reviewed patient's chart  Per [facility] consult, patient is able to d/c home  Cm contacted patient's medical team and they are in agreement with patient's discharge  Cm received confirmation from manager that department will cover one time payment of necessary medication  Clinical soical work specialist working on [id number] [facility] confirmation for bus pass for patient to return to [place]  Buses available tomorrow morning

## 2018-04-10 NOTE — PLAN OF CARE
Discharge to home or other facility with appropriate resources Adequate for Discharge      Absence or prevention of progression during hospitalization Adequate for Discharge      Absence of fever/infection during neutropenic period Adequate for Discharge      Patient/family/caregiver demonstrates understanding of disease process, treatment plan, medications, and discharge instructions Adequate for Discharge      Absence of seizures Adequate for Discharge      Nutrient/Hydration intake appropriate for improving, restoring or maintaining nutritional needs Adequate for Discharge      Verbalizes/displays adequate comfort level or baseline comfort level Adequate for Discharge      Patient will remain free of falls Adequate for Discharge      Skin integrity is maintained or improved Adequate for Discharge      Maintain or return to baseline ADL function Adequate for Discharge      Maintain or return mobility status to optimal level Adequate for Discharge      Patient will remain free of falls Adequate for Discharge      Skin integrity remains intact Adequate for Discharge      Incision(s), wounds(s) or drain site(s) healing without S/S of infection Adequate for Discharge      Oral mucous membranes remain intact Adequate for Discharge

## 2018-04-10 NOTE — SOCIAL WORK
CSWS received notification that patient has capacity to make healthcare decisions  Patient is stable for discharge  Awaiting call back from  of Care Management, Lucrezia Severs to approve a bus pass for patient to get back to [facility], where he is from  CSWS researched direct bus transports  [employer] transportation in [place] to [facility]  CSWS spoke with Charge RN, CHARLEE Acosta CM, Arleth Mitchell and SOD, Dr Viviana Santiago to provide an update  Patient will also be given a taxi voucher for transportation to [facility]

## 2018-04-10 NOTE — PLAN OF CARE
Discharge to home or other facility with appropriate resources Progressing      Absence or prevention of progression during hospitalization Progressing      Absence of fever/infection during neutropenic period Progressing      Patient/family/caregiver demonstrates understanding of disease process, treatment plan, medications, and discharge instructions Progressing      Absence of seizures Progressing      Nutrient/Hydration intake appropriate for improving, restoring or maintaining nutritional needs Progressing      Verbalizes/displays adequate comfort level or baseline comfort level Progressing      Patient will remain free of falls Progressing      Skin integrity is maintained or improved Progressing      Maintain or return to baseline ADL function Progressing      Maintain or return mobility status to optimal level Progressing      Patient will remain free of falls Progressing      Skin integrity remains intact Progressing      Incision(s), wounds(s) or drain site(s) healing without S/S of infection Progressing      Oral mucous membranes remain intact Progressing

## 2018-04-10 NOTE — PROGRESS NOTES
IM Residency Progress Note   Unit/Bed#: [facility] 920-01 Encounter: [id number]  SOD Team B       Deadouglas Do 43 y o  male [address] Stay Days: 15      Assessment/Plan:    Principal Problem:    Acute encephalopathy  Active Problems:    Hypertension    Encephalomalacia    Head trauma    Status post craniectomy    Necrotic eschar    Hypophosphatemia    Seizure (HCC)    1   Acute encephalopathy 2/2 likely seizure activity secondary to underlying encephalomalacia from TBI  · Improved encephalopathy  Does have a history of 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   Encephalomalacia seen on CT scan on 03/26  · Keppra 1 g po b i d  Switched to carbamazepine 2/2 affordability   Platelet count is stable   No need to continue to monitor platelets at this time though patient should ideally have platelets monitored in the outpatient setting periodically to assess stability  · Patient does not have any active IV lines and is a difficult stick   His medications are p o       · BP goal normotensive   Continue Norvasc 5 mg   Controlled  · PT/OT  Activity as tolerated   Fall precautions       2   Dementia with adjustment disorder  · Patient evaluated by neuropsych, deemed to have dementia, adjustment disorder, alcohol use     · Patient does not have capacity to make fully informed decisions at this time though his ability to make medical decisions on his own behalf may need to be reassessed as patient has improved from a neurological standpoint since his initial assessment - this is being looked into with case management   Limited to no close contacts/family available to reach- attempts thus far unsuccessful per CM     3   Hypertension  · Controlled   Continue p o  Norvasc        4   Alcohol use  · Patient has been hospitalized since March 26   Last drink was before hospitalization, no concern for withdrawal at this time  · Continue to monitor  · Thiamine and folic acid    5    VTE prophylaxis  · Continue heparin subcutaneous  · Patient refusing sequential compression device and is ambulatory      Disposition:  Patient medically stable for discharge  Working with legal services, case management, clinical risk management for placement  Subjective:   Patient examined at bedside  RN states no acute events overnight  Patient states he feels okay  Patient states he is voiding regularly  Patient states his last bowel movement was about 30 minutes prior to examination  Patient states he is sleeping well  Patient is ambulatory  All other review of systems negative  Vitals: Temp (24hrs), Av 3 °F (36 8 °C), Min:98 2 °F (36 8 °C), Max:98 3 °F (36 8 °C)  Current: Temperature: 98 3 °F (36 8 °C)  Vitals:    18 0646 18 1556 18 2351 04/10/18 0643   BP: 115/83 111/66 99/60 120/77   BP Location: Right arm Right arm Right arm Right arm   Pulse: 75 77 66 68   Resp: 18 18 18 16   Temp: 98 4 °F (36 9 °C) 98 2 °F (36 8 °C) 98 3 °F (36 8 °C) 98 3 °F (36 8 °C)   TempSrc: Oral Oral Oral Oral   SpO2: 100% 100% 100% 98%   Weight:       Height:        Body mass index is 31 99 kg/m²  I/O last 24 hours: In:  [P O :]  Out: -     Physical Exam   Constitutional: He appears well-developed and well-nourished  No distress  HENT:   Head: Normocephalic and atraumatic  Mouth/Throat: No oropharyngeal exudate  Eyes: Conjunctivae and EOM are normal  Pupils are equal, round, and reactive to light  No scleral icterus  Neck: Normal range of motion  Cardiovascular: Normal rate, regular rhythm, normal heart sounds and intact distal pulses  Exam reveals no gallop and no friction rub  No murmur heard  Pulmonary/Chest: Effort normal and breath sounds normal  No respiratory distress  He has no wheezes  He has no rales  Abdominal: Soft  Bowel sounds are normal  He exhibits no distension and no mass  There is no tenderness  There is no guarding     Musculoskeletal: Normal range of motion  He exhibits no edema  Neurological: No cranial nerve deficit  He exhibits normal muscle tone  Coordination normal    Alert and oriented to self only  Patient thinks it is 2005  Patient does not know he is in the hospital   Patient thinks he is in [facility]  Answers questions appropriately  Follows commands appropriately  Skin: Skin is warm and dry  He is not diaphoretic  Triangular shaped black eschar on the anterior surface of his left forearm  Nursing note and vitals reviewed  Invasive Devices          No matching active lines, drains, or airways                    Labs: No results found for this or any previous visit (from the past 24 hour(s))  Radiology Results: I have personally reviewed pertinent reports  Other Diagnostic Testing:   I have personally reviewed pertinent reports          Active Meds:   Current Facility-Administered Medications   Medication Dose Route Frequency    acetaminophen (TYLENOL) tablet 650 mg  650 mg Oral Q12H PRN    amLODIPine (NORVASC) tablet 5 mg  5 mg Oral Daily    carBAMazepine (TEGretol) tablet 200 mg  200 mg Oral BID    folic acid (FOLVITE) tablet 400 mcg  400 mcg Oral Daily    heparin (porcine) subcutaneous injection 5,000 Units  5,000 Units Subcutaneous Q8H [facility]    pantoprazole (PROTONIX) EC tablet 40 mg  40 mg Oral Early Morning    thiamine (VITAMIN B1) tablet 100 mg  100 mg Oral Daily         VTE Pharmacologic Prophylaxis: Heparin  VTE Mechanical Prophylaxis: reason for no mechanical VTE prophylaxis Patient refusing, patient ambulatory    Madelaine Garcia MD

## 2018-04-10 NOTE — SOCIAL WORK
Bus Pass was obtained for 10:15 am via [employer] in [facility]  Taxi Voucher was completed  CSWS spoke with Charge RN and SOD to provide update on discharge  CSWS left a voicemail and email to Jaqueline DESHPANDE Cm to provide update

## 2018-04-11 VITALS
BODY MASS INDEX: 32.16 KG/M2 | SYSTOLIC BLOOD PRESSURE: 177 MMHG | DIASTOLIC BLOOD PRESSURE: 78 MMHG | HEIGHT: 60 IN | HEART RATE: 86 BPM | OXYGEN SATURATION: 94 % | WEIGHT: 163.8 LBS | TEMPERATURE: 98.4 F | RESPIRATION RATE: 18 BRPM

## 2018-04-11 RX ADMIN — AMLODIPINE BESYLATE 5 MG: 5 TABLET ORAL at 08:20

## 2018-04-11 RX ADMIN — PANTOPRAZOLE SODIUM 40 MG: 40 TABLET, DELAYED RELEASE ORAL at 05:24

## 2018-04-11 RX ADMIN — HEPARIN SODIUM 5000 UNITS: 5000 INJECTION, SOLUTION INTRAVENOUS; SUBCUTANEOUS at 05:24

## 2018-04-11 RX ADMIN — Medication 100 MG: at 08:13

## 2018-04-11 RX ADMIN — Medication 400 MCG: at 08:13

## 2018-04-11 RX ADMIN — CARBAMAZEPINE 200 MG: 200 TABLET ORAL at 08:13

## 2018-04-11 NOTE — PROGRESS NOTES
IM Residency Progress Note   [facility] Encounter: [id number]  SOD Team B       Kim Divers 43 y o  male [address] Stay Days: 16      Assessment/Plan:    Active Problems:    Hypertension    Encephalomalacia    Head trauma    Status post craniectomy    Necrotic eschar    Seizure ([facility] [address] )    1   Acute encephalopathy 2/2 likely seizure activity secondary to underlying encephalomalacia from TBI  · Improved encephalopathy  Does have a history of 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   Encephalomalacia seen on CT scan on 03/26  · Keppra 1 g po b i d  Switched to carbamazepine 2/2 affordability   Platelet count is stable   No need to continue to monitor platelets at this time though patient should ideally have platelets monitored in the outpatient setting periodically to assess stability  · Patient does not have any active IV lines and is a difficult stick   His medications are p o       · BP goal normotensive   Continue Norvasc 5 mg   Controlled  · PT/OT  Activity as tolerated   Fall precautions       2   Dementia with adjustment disorder  · Patient evaluated by neuropsych, deemed to have dementia, adjustment disorder, alcohol use     · Patient does not have capacity to make fully informed decisions at this time though his ability to make medical decisions on his own behalf may need to be reassessed as patient has improved from a neurological standpoint since his initial assessment - this is being looked into with case management   Limited to no close contacts/family available to reach- attempts thus far unsuccessful per CM     3   Hypertension  · Controlled   Continue p o  Norvasc        4   Alcohol use  · Patient has been hospitalized since March 26   Last drink was before hospitalization, no concern for withdrawal at this time  · Continue to monitor  · Thiamine and folic acid    5    VTE prophylaxis  · Continue heparin subcutaneous  · Patient refusing sequential compression device and is ambulatory      Disposition:  Patient will be discharged and transferred to [facility] this morning  Subjective:   Patient examined at bedside  RN states no acute events overnight  Patient states he feels well  Patient states he is waiting urine regularly  Patient states last bowel movement yesterday  Patient states he is sleeping well  All other review of systems negative  Vitals: Temp (24hrs), Av 3 °F (36 8 °C), Min:98 1 °F (36 7 °C), Max:98 5 °F (36 9 °C)  Current: Temperature: 98 1 °F (36 7 °C)  Vitals:    04/10/18 0643 04/10/18 1019 04/10/18 1559 04/10/18 2355   BP: 120/77  118/68 114/63   BP Location: Right arm  Left arm Right arm   Pulse: 68  81 81   Resp:  18   Temp: 98 3 °F (36 8 °C)  98 5 °F (36 9 °C) 98 1 °F (36 7 °C)   TempSrc: Oral  Oral Oral   SpO2: 98% 98% 96% 96%   Weight:       Height:        Body mass index is 31 99 kg/m²  I/O last 24 hours: In: 5 [P O :720]  Out: 0     Physical Exam   Constitutional: He appears well-developed and well-nourished  No distress  HENT:   Head: Normocephalic and atraumatic  Mouth/Throat: No oropharyngeal exudate  Eyes: Conjunctivae and EOM are normal  Pupils are equal, round, and reactive to light  No scleral icterus  Neck: Normal range of motion  Cardiovascular: Normal rate, regular rhythm, normal heart sounds and intact distal pulses  Exam reveals no gallop and no friction rub  No murmur heard  Pulmonary/Chest: Effort normal and breath sounds normal  No respiratory distress  He has no wheezes  He has no rales  Abdominal: Soft  Bowel sounds are normal  He exhibits no distension and no mass  There is no tenderness  There is no guarding  Musculoskeletal: Normal range of motion  He exhibits no edema  Neurological: No cranial nerve deficit  He exhibits normal muscle tone  Coordination normal    Alert and oriented to self and state(location)  Patient thinks it is     [place]   Answers questions appropriately  Follows commands appropriately  Skin: Skin is warm and dry  He is not diaphoretic  Triangular shaped black eschar on the anterior surface of his left forearm  Does not appear infected  Mild erythema without discharge  Nursing note and vitals reviewed  Invasive Devices          No matching active lines, drains, or airways                    Labs: No results found for this or any previous visit (from the past 24 hour(s))  Radiology Results: I have personally reviewed pertinent reports  Other Diagnostic Testing:   I have personally reviewed pertinent reports          Active Meds:   Current Facility-Administered Medications   Medication Dose Route Frequency    acetaminophen (TYLENOL) tablet 650 mg  650 mg Oral Q12H PRN    amLODIPine (NORVASC) tablet 5 mg  5 mg Oral Daily    carBAMazepine (TEGretol) tablet 200 mg  200 mg Oral BID    folic acid (FOLVITE) tablet 400 mcg  400 mcg Oral Daily    heparin (porcine) subcutaneous injection 5,000 Units  5,000 Units Subcutaneous Q8H [facility]    pantoprazole (PROTONIX) EC tablet 40 mg  40 mg Oral Early Morning    thiamine (VITAMIN B1) tablet 100 mg  100 mg Oral Daily         VTE Pharmacologic Prophylaxis: Heparin  VTE Mechanical Prophylaxis: reason for no mechanical VTE prophylaxis Patient refusing, patient ambulatory    Ruby Clarke MD

## 2018-04-11 NOTE — NURSING NOTE
Patient given medicine bottles from pharmacy and given bus ticket  Pca will hand taxi voucher to taxi cab     used to give d/c instructions and ensure patient understands tickets and where he is going today

## 2018-04-11 NOTE — SOCIAL WORK
CSWS met with patient to discuss discharge planning with [employer]  Gordon Manuel) present via phone  CSWS explained that the hospital has paid for a taxi voucher to take patient to the [employer] stop for direct transportation to the [employer]  CSWS showed patient the bus pass and instructed patient that he would need to present this at the bus station  Patient is aware that the bus leaves at 10:15 am  CSWS explained that patient will be given his bus pass with his discharge instructions  Patient was able to repeat back the plan for him to get back to his hometown, [facility], which patient has expressed he prefers to be discharged to [facility]  CSWS spoke with RN, Serenity Solis about above information and provided the taxi voucher and the bus pass  Serenity Solis will contact the [employer] company shortly  Serenity Solis will provide CM with the copy of the taxi voucher  CSWS also updated RN JAVI Khan of the above information